# Patient Record
Sex: MALE | Race: WHITE | URBAN - METROPOLITAN AREA
[De-identification: names, ages, dates, MRNs, and addresses within clinical notes are randomized per-mention and may not be internally consistent; named-entity substitution may affect disease eponyms.]

---

## 2017-01-16 ENCOUNTER — HISTORICAL (OUTPATIENT)
Dept: ADMINISTRATIVE | Facility: HOSPITAL | Age: 82
End: 2017-01-16

## 2017-05-01 ENCOUNTER — HISTORICAL (OUTPATIENT)
Dept: ADMINISTRATIVE | Facility: HOSPITAL | Age: 82
End: 2017-05-01

## 2017-05-01 LAB
ABS NEUT (OLG): 6.14 X10(3)/MCL (ref 2.1–9.2)
BASOPHILS # BLD AUTO: 0 X10(3)/MCL (ref 0–0.2)
BASOPHILS NFR BLD AUTO: 0 %
BUN SERPL-MCNC: 38 MG/DL (ref 7–18)
CALCIUM SERPL-MCNC: 9.1 MG/DL (ref 8.5–10.1)
CHLORIDE SERPL-SCNC: 105 MMOL/L (ref 98–107)
CO2 SERPL-SCNC: 32 MMOL/L (ref 21–32)
CREAT SERPL-MCNC: 1.43 MG/DL (ref 0.7–1.3)
EOSINOPHIL # BLD AUTO: 0.1 X10(3)/MCL (ref 0–0.9)
EOSINOPHIL NFR BLD AUTO: 1 %
ERYTHROCYTE [DISTWIDTH] IN BLOOD BY AUTOMATED COUNT: 13.1 % (ref 11.5–17)
GLUCOSE SERPL-MCNC: 89 MG/DL (ref 74–106)
HCT VFR BLD AUTO: 39.2 % (ref 42–52)
HGB BLD-MCNC: 12.9 GM/DL (ref 14–18)
LYMPHOCYTES # BLD AUTO: 0.7 X10(3)/MCL (ref 0.6–4.6)
LYMPHOCYTES NFR BLD AUTO: 9 %
MCH RBC QN AUTO: 33.7 PG (ref 27–31)
MCHC RBC AUTO-ENTMCNC: 32.9 GM/DL (ref 33–36)
MCV RBC AUTO: 102.3 FL (ref 80–94)
MONOCYTES # BLD AUTO: 0.8 X10(3)/MCL (ref 0.1–1.3)
MONOCYTES NFR BLD AUTO: 10 %
NEUTROPHILS # BLD AUTO: 6.14 X10(3)/MCL (ref 2.1–9.2)
NEUTROPHILS NFR BLD AUTO: 79 %
PLATELET # BLD AUTO: 161 X10(3)/MCL (ref 130–400)
PMV BLD AUTO: 11.8 FL (ref 9.4–12.4)
POTASSIUM SERPL-SCNC: 4.8 MMOL/L (ref 3.5–5.1)
PSA SERPL-MCNC: 3.02 NG/ML (ref 0–4)
RBC # BLD AUTO: 3.83 X10(6)/MCL (ref 4.7–6.1)
SODIUM SERPL-SCNC: 144 MMOL/L (ref 136–145)
WBC # SPEC AUTO: 7.8 X10(3)/MCL (ref 4.5–11.5)

## 2017-06-06 ENCOUNTER — HISTORICAL (OUTPATIENT)
Dept: ADMINISTRATIVE | Facility: HOSPITAL | Age: 82
End: 2017-06-06

## 2017-08-09 ENCOUNTER — HISTORICAL (OUTPATIENT)
Dept: ANESTHESIOLOGY | Facility: HOSPITAL | Age: 82
End: 2017-08-09

## 2017-11-08 ENCOUNTER — HISTORICAL (OUTPATIENT)
Dept: ADMINISTRATIVE | Facility: HOSPITAL | Age: 82
End: 2017-11-08

## 2017-11-08 LAB
ABS NEUT (OLG): 6.23 X10(3)/MCL (ref 2.1–9.2)
ALBUMIN SERPL-MCNC: 3.5 GM/DL (ref 3.4–5)
ALBUMIN/GLOB SERPL: 1.3 {RATIO}
ALP SERPL-CCNC: 80 UNIT/L (ref 50–136)
ALT SERPL-CCNC: 12 UNIT/L (ref 12–78)
AST SERPL-CCNC: 12 UNIT/L (ref 15–37)
BASOPHILS # BLD AUTO: 0 X10(3)/MCL (ref 0–0.2)
BASOPHILS NFR BLD AUTO: 0 %
BILIRUB SERPL-MCNC: 1.1 MG/DL (ref 0.2–1)
BILIRUBIN DIRECT+TOT PNL SERPL-MCNC: 0.3 MG/DL (ref 0–0.2)
BILIRUBIN DIRECT+TOT PNL SERPL-MCNC: 0.8 MG/DL (ref 0–0.8)
BUN SERPL-MCNC: 40 MG/DL (ref 7–18)
CALCIUM SERPL-MCNC: 9.4 MG/DL (ref 8.5–10.1)
CHLORIDE SERPL-SCNC: 107 MMOL/L (ref 98–107)
CHOLEST SERPL-MCNC: 142 MG/DL (ref 0–200)
CHOLEST/HDLC SERPL: 2.7 {RATIO} (ref 0–5)
CO2 SERPL-SCNC: 29 MMOL/L (ref 21–32)
CREAT SERPL-MCNC: 1.5 MG/DL (ref 0.7–1.3)
DEPRECATED CALCIDIOL+CALCIFEROL SERPL-MC: 41.93 NG/ML (ref 30–80)
EOSINOPHIL # BLD AUTO: 0 X10(3)/MCL (ref 0–0.9)
EOSINOPHIL NFR BLD AUTO: 0 %
ERYTHROCYTE [DISTWIDTH] IN BLOOD BY AUTOMATED COUNT: 12.9 % (ref 11.5–17)
GLOBULIN SER-MCNC: 2.6 GM/DL (ref 2.4–3.5)
GLUCOSE SERPL-MCNC: 105 MG/DL (ref 74–106)
HCT VFR BLD AUTO: 42 % (ref 42–52)
HDLC SERPL-MCNC: 53 MG/DL (ref 35–60)
HGB BLD-MCNC: 12.9 GM/DL (ref 14–18)
LDLC SERPL CALC-MCNC: 67 MG/DL (ref 0–129)
LYMPHOCYTES # BLD AUTO: 0.5 X10(3)/MCL (ref 0.6–4.6)
LYMPHOCYTES NFR BLD AUTO: 7 %
MCH RBC QN AUTO: 32.8 PG (ref 27–31)
MCHC RBC AUTO-ENTMCNC: 30.7 GM/DL (ref 33–36)
MCV RBC AUTO: 106.9 FL (ref 80–94)
MONOCYTES # BLD AUTO: 0.6 X10(3)/MCL (ref 0.1–1.3)
MONOCYTES NFR BLD AUTO: 8 %
NEUTROPHILS # BLD AUTO: 6.23 X10(3)/MCL (ref 1.4–7.9)
NEUTROPHILS NFR BLD AUTO: 84 %
PLATELET # BLD AUTO: 159 X10(3)/MCL (ref 130–400)
PMV BLD AUTO: 12.4 FL (ref 9.4–12.4)
POTASSIUM SERPL-SCNC: 5.2 MMOL/L (ref 3.5–5.1)
PROT SERPL-MCNC: 6.1 GM/DL (ref 6.4–8.2)
RBC # BLD AUTO: 3.93 X10(6)/MCL (ref 4.7–6.1)
SODIUM SERPL-SCNC: 145 MMOL/L (ref 136–145)
TRIGL SERPL-MCNC: 108 MG/DL (ref 30–150)
VLDLC SERPL CALC-MCNC: 22 MG/DL
WBC # SPEC AUTO: 7.4 X10(3)/MCL (ref 4.5–11.5)

## 2017-12-28 ENCOUNTER — HISTORICAL (OUTPATIENT)
Dept: ANESTHESIOLOGY | Facility: HOSPITAL | Age: 82
End: 2017-12-28

## 2017-12-29 LAB — RAPID GROUP A STREP (OHS): POSITIVE

## 2018-01-16 ENCOUNTER — HISTORICAL (OUTPATIENT)
Dept: RADIOLOGY | Facility: HOSPITAL | Age: 83
End: 2018-01-16

## 2018-02-12 ENCOUNTER — HISTORICAL (OUTPATIENT)
Dept: LAB | Facility: HOSPITAL | Age: 83
End: 2018-02-12

## 2018-02-12 LAB
ABS NEUT (OLG): 3.63 X10(3)/MCL (ref 2.1–9.2)
ALBUMIN SERPL-MCNC: 3.1 GM/DL (ref 3.4–5)
ALBUMIN/GLOB SERPL: 1 {RATIO}
ALP SERPL-CCNC: 65 UNIT/L (ref 50–136)
ALT SERPL-CCNC: 8 UNIT/L (ref 12–78)
APTT PPP: 36.1 SECOND(S) (ref 24.8–36.9)
AST SERPL-CCNC: 12 UNIT/L (ref 15–37)
BASOPHILS # BLD AUTO: 0 X10(3)/MCL (ref 0–0.2)
BASOPHILS NFR BLD AUTO: 1 %
BILIRUB SERPL-MCNC: 0.8 MG/DL (ref 0.2–1)
BILIRUBIN DIRECT+TOT PNL SERPL-MCNC: 0.2 MG/DL (ref 0–0.2)
BILIRUBIN DIRECT+TOT PNL SERPL-MCNC: 0.6 MG/DL (ref 0–0.8)
BUN SERPL-MCNC: 34 MG/DL (ref 7–18)
CALCIUM SERPL-MCNC: 9.1 MG/DL (ref 8.5–10.1)
CHLORIDE SERPL-SCNC: 105 MMOL/L (ref 98–107)
CO2 SERPL-SCNC: 31 MMOL/L (ref 21–32)
CREAT SERPL-MCNC: 1.37 MG/DL (ref 0.7–1.3)
EOSINOPHIL # BLD AUTO: 0.2 X10(3)/MCL (ref 0–0.9)
EOSINOPHIL NFR BLD AUTO: 3 %
ERYTHROCYTE [DISTWIDTH] IN BLOOD BY AUTOMATED COUNT: 12.5 % (ref 11.5–17)
GLOBULIN SER-MCNC: 3 GM/DL (ref 2.4–3.5)
GLUCOSE SERPL-MCNC: 84 MG/DL (ref 74–106)
HCT VFR BLD AUTO: 38 % (ref 42–52)
HGB BLD-MCNC: 12 GM/DL (ref 14–18)
INR PPP: 0.96 (ref 0–1.27)
LYMPHOCYTES # BLD AUTO: 0.7 X10(3)/MCL (ref 0.6–4.6)
LYMPHOCYTES NFR BLD AUTO: 13 %
MCH RBC QN AUTO: 32.3 PG (ref 27–31)
MCHC RBC AUTO-ENTMCNC: 31.6 GM/DL (ref 33–36)
MCV RBC AUTO: 102.2 FL (ref 80–94)
MONOCYTES # BLD AUTO: 0.6 X10(3)/MCL (ref 0.1–1.3)
MONOCYTES NFR BLD AUTO: 12 %
NEUTROPHILS # BLD AUTO: 3.63 X10(3)/MCL (ref 1.4–7.9)
NEUTROPHILS NFR BLD AUTO: 70 %
PLATELET # BLD AUTO: 209 X10(3)/MCL (ref 130–400)
PMV BLD AUTO: 11.6 FL (ref 9.4–12.4)
POTASSIUM SERPL-SCNC: 4.6 MMOL/L (ref 3.5–5.1)
PROT SERPL-MCNC: 6.1 GM/DL (ref 6.4–8.2)
PROTHROMBIN TIME: 13.1 SECOND(S) (ref 12.2–14.7)
RBC # BLD AUTO: 3.72 X10(6)/MCL (ref 4.7–6.1)
SODIUM SERPL-SCNC: 144 MMOL/L (ref 136–145)
WBC # SPEC AUTO: 5.2 X10(3)/MCL (ref 4.5–11.5)

## 2018-02-14 ENCOUNTER — HISTORICAL (OUTPATIENT)
Dept: ADMINISTRATIVE | Facility: HOSPITAL | Age: 83
End: 2018-02-14

## 2018-05-08 ENCOUNTER — HISTORICAL (OUTPATIENT)
Dept: ADMINISTRATIVE | Facility: HOSPITAL | Age: 83
End: 2018-05-08

## 2018-05-08 LAB
ABS NEUT (OLG): 5.65 X10(3)/MCL (ref 2.1–9.2)
BASOPHILS # BLD AUTO: 0 X10(3)/MCL (ref 0–0.2)
BASOPHILS NFR BLD AUTO: 0 %
BUN SERPL-MCNC: 37 MG/DL (ref 7–18)
CALCIUM SERPL-MCNC: 9.3 MG/DL (ref 8.5–10.1)
CHLORIDE SERPL-SCNC: 102 MMOL/L (ref 98–107)
CHOLEST SERPL-MCNC: 191 MG/DL (ref 0–200)
CHOLEST/HDLC SERPL: 3.5 {RATIO} (ref 0–5)
CO2 SERPL-SCNC: 29 MMOL/L (ref 21–32)
CREAT SERPL-MCNC: 1.43 MG/DL (ref 0.7–1.3)
CREAT/UREA NIT SERPL: 25.9
EOSINOPHIL # BLD AUTO: 0 X10(3)/MCL (ref 0–0.9)
EOSINOPHIL NFR BLD AUTO: 1 %
ERYTHROCYTE [DISTWIDTH] IN BLOOD BY AUTOMATED COUNT: 15.5 % (ref 11.5–17)
GLUCOSE SERPL-MCNC: 91 MG/DL (ref 74–106)
HCT VFR BLD AUTO: 40.8 % (ref 42–52)
HDLC SERPL-MCNC: 54 MG/DL (ref 35–60)
HGB BLD-MCNC: 13.2 GM/DL (ref 14–18)
LDLC SERPL CALC-MCNC: 110 MG/DL (ref 0–129)
LYMPHOCYTES # BLD AUTO: 0.3 X10(3)/MCL (ref 0.6–4.6)
LYMPHOCYTES NFR BLD AUTO: 4 %
MCH RBC QN AUTO: 32.4 PG (ref 27–31)
MCHC RBC AUTO-ENTMCNC: 32.4 GM/DL (ref 33–36)
MCV RBC AUTO: 100 FL (ref 80–94)
MONOCYTES # BLD AUTO: 0.4 X10(3)/MCL (ref 0.1–1.3)
MONOCYTES NFR BLD AUTO: 7 %
NEUTROPHILS # BLD AUTO: 5.65 X10(3)/MCL (ref 2.1–9.2)
NEUTROPHILS NFR BLD AUTO: 87 %
PLATELET # BLD AUTO: 167 X10(3)/MCL (ref 130–400)
PMV BLD AUTO: 11.8 FL (ref 9.4–12.4)
POTASSIUM SERPL-SCNC: 4.4 MMOL/L (ref 3.5–5.1)
PSA SERPL-MCNC: 7.55 NG/ML (ref 0–4)
RBC # BLD AUTO: 4.08 X10(6)/MCL (ref 4.7–6.1)
SODIUM SERPL-SCNC: 141 MMOL/L (ref 136–145)
TESTOST SERPL-MCNC: <7 NG/DL (ref 241–827)
TRIGL SERPL-MCNC: 137 MG/DL (ref 30–150)
VLDLC SERPL CALC-MCNC: 27 MG/DL
WBC # SPEC AUTO: 6.5 X10(3)/MCL (ref 4.5–11.5)

## 2018-11-30 ENCOUNTER — HISTORICAL (OUTPATIENT)
Dept: ADMINISTRATIVE | Facility: HOSPITAL | Age: 83
End: 2018-11-30

## 2018-11-30 LAB
ABS NEUT (OLG): 4.05 X10(3)/MCL (ref 2.1–9.2)
ALBUMIN SERPL-MCNC: 3.3 GM/DL (ref 3.4–5)
ALBUMIN/GLOB SERPL: 1.1 {RATIO}
ALP SERPL-CCNC: 74 UNIT/L (ref 50–136)
ALT SERPL-CCNC: 12 UNIT/L (ref 12–78)
AST SERPL-CCNC: 17 UNIT/L (ref 15–37)
BASOPHILS # BLD AUTO: 0 X10(3)/MCL (ref 0–0.2)
BASOPHILS NFR BLD AUTO: 1 %
BILIRUB SERPL-MCNC: 1.2 MG/DL (ref 0.2–1)
BILIRUBIN DIRECT+TOT PNL SERPL-MCNC: 0.2 MG/DL (ref 0–0.2)
BILIRUBIN DIRECT+TOT PNL SERPL-MCNC: 1 MG/DL (ref 0–0.8)
BUN SERPL-MCNC: 37 MG/DL (ref 7–18)
CALCIUM SERPL-MCNC: 9.1 MG/DL (ref 8.5–10.1)
CHLORIDE SERPL-SCNC: 102 MMOL/L (ref 98–107)
CHOLEST SERPL-MCNC: 182 MG/DL (ref 0–200)
CHOLEST/HDLC SERPL: 3.1 {RATIO} (ref 0–5)
CO2 SERPL-SCNC: 29 MMOL/L (ref 21–32)
CREAT SERPL-MCNC: 1.6 MG/DL (ref 0.7–1.3)
DEPRECATED CALCIDIOL+CALCIFEROL SERPL-MC: 39.16 NG/ML (ref 30–80)
EOSINOPHIL # BLD AUTO: 0.1 X10(3)/MCL (ref 0–0.9)
EOSINOPHIL NFR BLD AUTO: 1 %
ERYTHROCYTE [DISTWIDTH] IN BLOOD BY AUTOMATED COUNT: 13.2 % (ref 11.5–17)
GLOBULIN SER-MCNC: 3 GM/DL (ref 2.4–3.5)
GLUCOSE SERPL-MCNC: 93 MG/DL (ref 74–106)
HCT VFR BLD AUTO: 43.1 % (ref 42–52)
HDLC SERPL-MCNC: 59 MG/DL (ref 35–60)
HGB BLD-MCNC: 13.7 GM/DL (ref 14–18)
LDLC SERPL CALC-MCNC: 95 MG/DL (ref 0–129)
LYMPHOCYTES # BLD AUTO: 0.5 X10(3)/MCL (ref 0.6–4.6)
LYMPHOCYTES NFR BLD AUTO: 10 %
MCH RBC QN AUTO: 32.7 PG (ref 27–31)
MCHC RBC AUTO-ENTMCNC: 31.8 GM/DL (ref 33–36)
MCV RBC AUTO: 102.9 FL (ref 80–94)
MONOCYTES # BLD AUTO: 0.7 X10(3)/MCL (ref 0.1–1.3)
MONOCYTES NFR BLD AUTO: 13 %
NEUTROPHILS # BLD AUTO: 4.05 X10(3)/MCL (ref 2.1–9.2)
NEUTROPHILS NFR BLD AUTO: 75 %
NRBC BLD AUTO-RTO: 0.6 % (ref 0–0.2)
PLATELET # BLD AUTO: 166 X10(3)/MCL (ref 130–400)
PMV BLD AUTO: 11.6 FL (ref 9.4–12.4)
POTASSIUM SERPL-SCNC: 5 MMOL/L (ref 3.5–5.1)
PROT SERPL-MCNC: 6.3 GM/DL (ref 6.4–8.2)
PSA SERPL-MCNC: 12.5 NG/ML (ref 0–4)
RBC # BLD AUTO: 4.19 X10(6)/MCL (ref 4.7–6.1)
SODIUM SERPL-SCNC: 139 MMOL/L (ref 136–145)
TESTOST SERPL-MCNC: <7 NG/DL (ref 241–827)
TRIGL SERPL-MCNC: 142 MG/DL (ref 30–150)
VLDLC SERPL CALC-MCNC: 28 MG/DL
WBC # SPEC AUTO: 5.4 X10(3)/MCL (ref 4.5–11.5)

## 2019-02-06 ENCOUNTER — HISTORICAL (OUTPATIENT)
Dept: ANESTHESIOLOGY | Facility: HOSPITAL | Age: 84
End: 2019-02-06

## 2019-05-21 ENCOUNTER — HISTORICAL (OUTPATIENT)
Dept: ANESTHESIOLOGY | Facility: HOSPITAL | Age: 84
End: 2019-05-21

## 2019-08-30 ENCOUNTER — HISTORICAL (OUTPATIENT)
Dept: ADMINISTRATIVE | Facility: HOSPITAL | Age: 84
End: 2019-08-30

## 2019-08-30 LAB
BUN SERPL-MCNC: 28 MG/DL (ref 7–18)
CALCIUM SERPL-MCNC: 9.3 MG/DL (ref 8.5–10.1)
CHLORIDE SERPL-SCNC: 106 MMOL/L (ref 98–107)
CHOLEST SERPL-MCNC: 155 MG/DL (ref 0–200)
CHOLEST/HDLC SERPL: 2.5 {RATIO} (ref 0–5)
CO2 SERPL-SCNC: 30 MMOL/L (ref 21–32)
CREAT SERPL-MCNC: 1.53 MG/DL (ref 0.7–1.3)
CREAT/UREA NIT SERPL: 18.3
GLUCOSE SERPL-MCNC: 100 MG/DL (ref 74–106)
HDLC SERPL-MCNC: 62 MG/DL (ref 35–60)
LDLC SERPL CALC-MCNC: 66 MG/DL (ref 0–129)
POTASSIUM SERPL-SCNC: 4.4 MMOL/L (ref 3.5–5.1)
SODIUM SERPL-SCNC: 141 MMOL/L (ref 136–145)
TRIGL SERPL-MCNC: 137 MG/DL (ref 30–150)
VLDLC SERPL CALC-MCNC: 27 MG/DL

## 2019-09-11 ENCOUNTER — HISTORICAL (OUTPATIENT)
Dept: ADMINISTRATIVE | Facility: HOSPITAL | Age: 84
End: 2019-09-11

## 2019-09-11 LAB
ABS NEUT (OLG): 4.58 X10(3)/MCL (ref 2.1–9.2)
BASOPHILS # BLD AUTO: 0 X10(3)/MCL (ref 0–0.2)
BASOPHILS NFR BLD AUTO: 0 %
BNP BLD-MCNC: 133 PG/ML (ref 0–125)
EOSINOPHIL # BLD AUTO: 0.1 X10(3)/MCL (ref 0–0.9)
EOSINOPHIL NFR BLD AUTO: 2 %
ERYTHROCYTE [DISTWIDTH] IN BLOOD BY AUTOMATED COUNT: 13.2 % (ref 11.5–17)
HCT VFR BLD AUTO: 45.1 % (ref 42–52)
HGB BLD-MCNC: 14.3 GM/DL (ref 14–18)
LYMPHOCYTES # BLD AUTO: 0.3 X10(3)/MCL (ref 0.6–4.6)
LYMPHOCYTES NFR BLD AUTO: 5 %
MCH RBC QN AUTO: 33.1 PG (ref 27–31)
MCHC RBC AUTO-ENTMCNC: 31.7 GM/DL (ref 33–36)
MCV RBC AUTO: 104.4 FL (ref 80–94)
MONOCYTES # BLD AUTO: 0.5 X10(3)/MCL (ref 0.1–1.3)
MONOCYTES NFR BLD AUTO: 9 %
NEUTROPHILS # BLD AUTO: 4.58 X10(3)/MCL (ref 2.1–9.2)
NEUTROPHILS NFR BLD AUTO: 83 %
PLATELET # BLD AUTO: 164 X10(3)/MCL (ref 130–400)
PMV BLD AUTO: 11.2 FL (ref 9.4–12.4)
RBC # BLD AUTO: 4.32 X10(6)/MCL (ref 4.7–6.1)
WBC # SPEC AUTO: 5.5 X10(3)/MCL (ref 4.5–11.5)

## 2019-09-12 ENCOUNTER — HISTORICAL (OUTPATIENT)
Dept: CARDIOLOGY | Facility: HOSPITAL | Age: 84
End: 2019-09-12

## 2020-01-09 ENCOUNTER — HISTORICAL (OUTPATIENT)
Dept: ANESTHESIOLOGY | Facility: HOSPITAL | Age: 85
End: 2020-01-09

## 2020-05-21 ENCOUNTER — HISTORICAL (OUTPATIENT)
Dept: ADMINISTRATIVE | Facility: HOSPITAL | Age: 85
End: 2020-05-21

## 2020-05-21 LAB — POC CREATININE: 1.5 MG/DL (ref 0.6–1.3)

## 2020-05-26 ENCOUNTER — HISTORICAL (OUTPATIENT)
Dept: ANESTHESIOLOGY | Facility: HOSPITAL | Age: 85
End: 2020-05-26

## 2020-06-04 ENCOUNTER — HISTORICAL (OUTPATIENT)
Dept: HEMATOLOGY/ONCOLOGY | Facility: CLINIC | Age: 85
End: 2020-06-04

## 2020-06-04 LAB
ABS NEUT (OLG): 3.53 X10(3)/MCL (ref 2.1–9.2)
ALBUMIN SERPL-MCNC: 3.5 GM/DL (ref 3.4–5)
ALBUMIN/GLOB SERPL: 1.1 RATIO (ref 1.1–2)
ALP SERPL-CCNC: 210 UNIT/L (ref 40–150)
ALT SERPL-CCNC: 6 UNIT/L (ref 0–55)
AST SERPL-CCNC: 22 UNIT/L (ref 5–34)
BASOPHILS # BLD AUTO: 0 X10(3)/MCL (ref 0–0.2)
BASOPHILS NFR BLD AUTO: 0.6 %
BILIRUB SERPL-MCNC: 0.5 MG/DL
BILIRUBIN DIRECT+TOT PNL SERPL-MCNC: 0.2 MG/DL (ref 0–0.5)
BILIRUBIN DIRECT+TOT PNL SERPL-MCNC: 0.3 MG/DL (ref 0–0.8)
BUN SERPL-MCNC: 27.5 MG/DL (ref 8.4–25.7)
CALCIUM SERPL-MCNC: 9 MG/DL (ref 8.8–10)
CHLORIDE SERPL-SCNC: 105 MMOL/L (ref 98–107)
CO2 SERPL-SCNC: 29 MMOL/L (ref 23–31)
CREAT SERPL-MCNC: 1.49 MG/DL (ref 0.73–1.18)
EOSINOPHIL # BLD AUTO: 0.1 X10(3)/MCL (ref 0–0.9)
EOSINOPHIL NFR BLD AUTO: 2.1 %
ERYTHROCYTE [DISTWIDTH] IN BLOOD BY AUTOMATED COUNT: 12.9 % (ref 11.5–17)
GLOBULIN SER-MCNC: 3.2 GM/DL (ref 2.4–3.5)
GLUCOSE SERPL-MCNC: 101 MG/DL (ref 82–115)
HCT VFR BLD AUTO: 43.4 % (ref 42–52)
HGB BLD-MCNC: 13.6 GM/DL (ref 14–18)
LYMPHOCYTES # BLD AUTO: 0.6 X10(3)/MCL (ref 0.6–4.6)
LYMPHOCYTES NFR BLD AUTO: 11.8 %
MCH RBC QN AUTO: 32.2 PG (ref 27–31)
MCHC RBC AUTO-ENTMCNC: 31.3 GM/DL (ref 33–36)
MCV RBC AUTO: 102.6 FL (ref 80–94)
MONOCYTES # BLD AUTO: 0.6 X10(3)/MCL (ref 0.1–1.3)
MONOCYTES NFR BLD AUTO: 11.8 %
NEUTROPHILS # BLD AUTO: 3.5 X10(3)/MCL (ref 2.1–9.2)
NEUTROPHILS NFR BLD AUTO: 73.3 %
PLATELET # BLD AUTO: 257 X10(3)/MCL (ref 130–400)
PMV BLD AUTO: 9.5 FL (ref 9.4–12.4)
POTASSIUM SERPL-SCNC: 5.6 MMOL/L (ref 3.5–5.1)
PROT SERPL-MCNC: 6.7 GM/DL (ref 5.8–7.6)
PSA SERPL-MCNC: 332.86 NG/ML
RBC # BLD AUTO: 4.23 X10(6)/MCL (ref 4.7–6.1)
SODIUM SERPL-SCNC: 141 MMOL/L (ref 136–145)
WBC # SPEC AUTO: 4.8 X10(3)/MCL (ref 4.5–11.5)

## 2020-07-07 ENCOUNTER — HISTORICAL (OUTPATIENT)
Dept: INFUSION THERAPY | Facility: HOSPITAL | Age: 85
End: 2020-07-07

## 2020-07-27 ENCOUNTER — HISTORICAL (OUTPATIENT)
Dept: HEMATOLOGY/ONCOLOGY | Facility: CLINIC | Age: 85
End: 2020-07-27

## 2020-07-27 LAB
ABS NEUT (OLG): 6.42 X10(3)/MCL (ref 2.1–9.2)
ALBUMIN SERPL-MCNC: 3.5 GM/DL (ref 3.4–4.8)
ALBUMIN/GLOB SERPL: 1.2 RATIO (ref 1.1–2)
ALP SERPL-CCNC: 191 UNIT/L (ref 40–150)
ALT SERPL-CCNC: 6 UNIT/L (ref 0–55)
AST SERPL-CCNC: 19 UNIT/L (ref 5–34)
BASOPHILS # BLD AUTO: 0 X10(3)/MCL (ref 0–0.2)
BASOPHILS NFR BLD AUTO: 0.3 %
BILIRUB SERPL-MCNC: 1 MG/DL
BILIRUBIN DIRECT+TOT PNL SERPL-MCNC: 0.4 MG/DL (ref 0–0.5)
BILIRUBIN DIRECT+TOT PNL SERPL-MCNC: 0.6 MG/DL (ref 0–0.8)
BUN SERPL-MCNC: 31.4 MG/DL (ref 8.4–25.7)
CALCIUM SERPL-MCNC: 8.9 MG/DL (ref 8.8–10)
CHLORIDE SERPL-SCNC: 106 MMOL/L (ref 98–107)
CO2 SERPL-SCNC: 26 MMOL/L (ref 23–31)
CREAT SERPL-MCNC: 1.45 MG/DL (ref 0.73–1.18)
EOSINOPHIL # BLD AUTO: 0.1 X10(3)/MCL (ref 0–0.9)
EOSINOPHIL NFR BLD AUTO: 1 %
ERYTHROCYTE [DISTWIDTH] IN BLOOD BY AUTOMATED COUNT: 15.7 % (ref 11.5–17)
GLOBULIN SER-MCNC: 3 GM/DL (ref 2.4–3.5)
GLUCOSE SERPL-MCNC: 104 MG/DL (ref 82–115)
HCT VFR BLD AUTO: 44.8 % (ref 42–52)
HGB BLD-MCNC: 13.6 GM/DL (ref 14–18)
LYMPHOCYTES # BLD AUTO: 0.5 X10(3)/MCL (ref 0.6–4.6)
LYMPHOCYTES NFR BLD AUTO: 5.9 %
MCH RBC QN AUTO: 32.3 PG (ref 27–31)
MCHC RBC AUTO-ENTMCNC: 30.4 GM/DL (ref 33–36)
MCV RBC AUTO: 106.4 FL (ref 80–94)
MONOCYTES # BLD AUTO: 0.7 X10(3)/MCL (ref 0.1–1.3)
MONOCYTES NFR BLD AUTO: 9.1 %
NEUTROPHILS # BLD AUTO: 6.4 X10(3)/MCL (ref 2.1–9.2)
NEUTROPHILS NFR BLD AUTO: 82.5 %
PLATELET # BLD AUTO: 184 X10(3)/MCL (ref 130–400)
PMV BLD AUTO: 10.3 FL (ref 9.4–12.4)
POTASSIUM SERPL-SCNC: 5.1 MMOL/L (ref 3.5–5.1)
PROT SERPL-MCNC: 6.5 GM/DL (ref 5.8–7.6)
PSA SERPL-MCNC: 423.18 NG/ML
RBC # BLD AUTO: 4.21 X10(6)/MCL (ref 4.7–6.1)
SODIUM SERPL-SCNC: 142 MMOL/L (ref 136–145)
WBC # SPEC AUTO: 7.8 X10(3)/MCL (ref 4.5–11.5)

## 2020-08-04 ENCOUNTER — HISTORICAL (OUTPATIENT)
Dept: INFUSION THERAPY | Facility: HOSPITAL | Age: 85
End: 2020-08-04

## 2020-08-28 ENCOUNTER — HISTORICAL (OUTPATIENT)
Dept: HEMATOLOGY/ONCOLOGY | Facility: CLINIC | Age: 85
End: 2020-08-28

## 2020-08-28 LAB
ABS NEUT (OLG): 7.37 X10(3)/MCL (ref 2.1–9.2)
ALBUMIN SERPL-MCNC: 3.5 GM/DL (ref 3.4–5)
ALBUMIN/GLOB SERPL: 1.3 RATIO (ref 1.1–2)
ALP SERPL-CCNC: 192 UNIT/L (ref 40–150)
ALT SERPL-CCNC: 5 UNIT/L (ref 0–55)
AST SERPL-CCNC: 19 UNIT/L (ref 5–34)
BASOPHILS # BLD AUTO: 0 X10(3)/MCL (ref 0–0.2)
BASOPHILS NFR BLD AUTO: 0.6 %
BILIRUB SERPL-MCNC: 1.4 MG/DL
BILIRUBIN DIRECT+TOT PNL SERPL-MCNC: 0.5 MG/DL (ref 0–0.5)
BILIRUBIN DIRECT+TOT PNL SERPL-MCNC: 0.9 MG/DL (ref 0–0.8)
BUN SERPL-MCNC: 22.5 MG/DL (ref 8.4–25.7)
CALCIUM SERPL-MCNC: 7.9 MG/DL (ref 8.8–10)
CHLORIDE SERPL-SCNC: 104 MMOL/L (ref 98–107)
CO2 SERPL-SCNC: 28 MMOL/L (ref 23–31)
CREAT SERPL-MCNC: 1.38 MG/DL (ref 0.73–1.18)
EOSINOPHIL # BLD AUTO: 0.1 X10(3)/MCL (ref 0–0.9)
EOSINOPHIL NFR BLD AUTO: 0.9 %
ERYTHROCYTE [DISTWIDTH] IN BLOOD BY AUTOMATED COUNT: 14.5 % (ref 11.5–17)
GLOBULIN SER-MCNC: 2.7 GM/DL (ref 2.4–3.5)
GLUCOSE SERPL-MCNC: 79 MG/DL (ref 82–115)
HCT VFR BLD AUTO: 46.8 % (ref 42–52)
HGB BLD-MCNC: 14.7 GM/DL (ref 14–18)
LYMPHOCYTES # BLD AUTO: 0.5 X10(3)/MCL (ref 0.6–4.6)
LYMPHOCYTES NFR BLD AUTO: 5.4 %
MCH RBC QN AUTO: 32.8 PG (ref 27–31)
MCHC RBC AUTO-ENTMCNC: 31.4 GM/DL (ref 33–36)
MCV RBC AUTO: 104.5 FL (ref 80–94)
MONOCYTES # BLD AUTO: 0.5 X10(3)/MCL (ref 0.1–1.3)
MONOCYTES NFR BLD AUTO: 5.9 %
NEUTROPHILS # BLD AUTO: 7.4 X10(3)/MCL (ref 2.1–9.2)
NEUTROPHILS NFR BLD AUTO: 86.3 %
PLATELET # BLD AUTO: 198 X10(3)/MCL (ref 130–400)
PMV BLD AUTO: 10.5 FL (ref 9.4–12.4)
POTASSIUM SERPL-SCNC: 5.3 MMOL/L (ref 3.5–5.1)
PROT SERPL-MCNC: 6.2 GM/DL (ref 5.8–7.6)
PSA SERPL-MCNC: 385.37 NG/ML
RBC # BLD AUTO: 4.48 X10(6)/MCL (ref 4.7–6.1)
SODIUM SERPL-SCNC: 139 MMOL/L (ref 136–145)
WBC # SPEC AUTO: 8.5 X10(3)/MCL (ref 4.5–11.5)

## 2020-08-31 ENCOUNTER — HISTORICAL (OUTPATIENT)
Dept: INFUSION THERAPY | Facility: HOSPITAL | Age: 85
End: 2020-08-31

## 2020-09-25 ENCOUNTER — HISTORICAL (OUTPATIENT)
Dept: HEMATOLOGY/ONCOLOGY | Facility: CLINIC | Age: 85
End: 2020-09-25

## 2020-09-25 LAB
ABS NEUT (OLG): 8.65 X10(3)/MCL (ref 2.1–9.2)
ALBUMIN SERPL-MCNC: 3.7 GM/DL (ref 3.4–5)
ALBUMIN/GLOB SERPL: 1.4 RATIO (ref 1.1–2)
ALP SERPL-CCNC: 161 UNIT/L (ref 40–150)
ALT SERPL-CCNC: 8 UNIT/L (ref 0–55)
AST SERPL-CCNC: 22 UNIT/L (ref 5–34)
BASOPHILS # BLD AUTO: 0 X10(3)/MCL (ref 0–0.2)
BASOPHILS NFR BLD AUTO: 0.2 %
BILIRUB SERPL-MCNC: 1.2 MG/DL
BILIRUBIN DIRECT+TOT PNL SERPL-MCNC: 0.4 MG/DL (ref 0–0.5)
BILIRUBIN DIRECT+TOT PNL SERPL-MCNC: 0.8 MG/DL (ref 0–0.8)
BUN SERPL-MCNC: 31.3 MG/DL (ref 8.4–25.7)
CALCIUM SERPL-MCNC: 8.7 MG/DL (ref 8.8–10)
CHLORIDE SERPL-SCNC: 102 MMOL/L (ref 98–107)
CO2 SERPL-SCNC: 32 MMOL/L (ref 23–31)
CREAT SERPL-MCNC: 1.4 MG/DL (ref 0.73–1.18)
EOSINOPHIL # BLD AUTO: 0 X10(3)/MCL (ref 0–0.9)
EOSINOPHIL NFR BLD AUTO: 0.1 %
ERYTHROCYTE [DISTWIDTH] IN BLOOD BY AUTOMATED COUNT: 13.6 % (ref 11.5–17)
GLOBULIN SER-MCNC: 2.7 GM/DL (ref 2.4–3.5)
GLUCOSE SERPL-MCNC: 105 MG/DL (ref 82–115)
HCT VFR BLD AUTO: 50.3 % (ref 42–52)
HGB BLD-MCNC: 15.9 GM/DL (ref 14–18)
LYMPHOCYTES # BLD AUTO: 0.5 X10(3)/MCL (ref 0.6–4.6)
LYMPHOCYTES NFR BLD AUTO: 5.1 %
MCH RBC QN AUTO: 32.3 PG (ref 27–31)
MCHC RBC AUTO-ENTMCNC: 31.6 GM/DL (ref 33–36)
MCV RBC AUTO: 102.2 FL (ref 80–94)
MONOCYTES # BLD AUTO: 0.4 X10(3)/MCL (ref 0.1–1.3)
MONOCYTES NFR BLD AUTO: 4.4 %
NEUTROPHILS # BLD AUTO: 8.6 X10(3)/MCL (ref 2.1–9.2)
NEUTROPHILS NFR BLD AUTO: 89.1 %
PLATELET # BLD AUTO: 198 X10(3)/MCL (ref 130–400)
PMV BLD AUTO: 10.5 FL (ref 9.4–12.4)
POTASSIUM SERPL-SCNC: 5.2 MMOL/L (ref 3.5–5.1)
PROT SERPL-MCNC: 6.4 GM/DL (ref 5.8–7.6)
PSA SERPL-MCNC: 395.66 NG/ML
RBC # BLD AUTO: 4.92 X10(6)/MCL (ref 4.7–6.1)
SODIUM SERPL-SCNC: 141 MMOL/L (ref 136–145)
WBC # SPEC AUTO: 9.7 X10(3)/MCL (ref 4.5–11.5)

## 2020-09-28 ENCOUNTER — HISTORICAL (OUTPATIENT)
Dept: INFUSION THERAPY | Facility: HOSPITAL | Age: 85
End: 2020-09-28

## 2020-10-23 ENCOUNTER — HISTORICAL (OUTPATIENT)
Dept: HEMATOLOGY/ONCOLOGY | Facility: CLINIC | Age: 85
End: 2020-10-23

## 2020-10-23 LAB
ABS NEUT (OLG): 8.75 X10(3)/MCL (ref 2.1–9.2)
ALBUMIN SERPL-MCNC: 3.5 GM/DL (ref 3.4–4.8)
ALBUMIN/GLOB SERPL: 1.1 RATIO (ref 1.1–2)
ALP SERPL-CCNC: 160 UNIT/L (ref 40–150)
ALT SERPL-CCNC: 8 UNIT/L (ref 0–55)
AST SERPL-CCNC: 22 UNIT/L (ref 5–34)
BASOPHILS # BLD AUTO: 0 X10(3)/MCL (ref 0–0.2)
BASOPHILS NFR BLD AUTO: 0.2 %
BILIRUB SERPL-MCNC: 1.1 MG/DL
BILIRUBIN DIRECT+TOT PNL SERPL-MCNC: 0.5 MG/DL (ref 0–0.5)
BILIRUBIN DIRECT+TOT PNL SERPL-MCNC: 0.6 MG/DL (ref 0–0.8)
BUN SERPL-MCNC: 35 MG/DL (ref 8.4–25.7)
CALCIUM SERPL-MCNC: 8.9 MG/DL (ref 8.8–10)
CHLORIDE SERPL-SCNC: 105 MMOL/L (ref 98–107)
CO2 SERPL-SCNC: 28 MMOL/L (ref 23–31)
CREAT SERPL-MCNC: 1.58 MG/DL (ref 0.73–1.18)
EOSINOPHIL # BLD AUTO: 0.1 X10(3)/MCL (ref 0–0.9)
EOSINOPHIL NFR BLD AUTO: 0.8 %
ERYTHROCYTE [DISTWIDTH] IN BLOOD BY AUTOMATED COUNT: 13.2 % (ref 11.5–17)
GLOBULIN SER-MCNC: 3.1 GM/DL (ref 2.4–3.5)
GLUCOSE SERPL-MCNC: 87 MG/DL (ref 82–115)
HCT VFR BLD AUTO: 48.5 % (ref 42–52)
HGB BLD-MCNC: 15.6 GM/DL (ref 14–18)
LYMPHOCYTES # BLD AUTO: 0.3 X10(3)/MCL (ref 0.6–4.6)
LYMPHOCYTES NFR BLD AUTO: 3.4 %
MCH RBC QN AUTO: 31.9 PG (ref 27–31)
MCHC RBC AUTO-ENTMCNC: 32.2 GM/DL (ref 33–36)
MCV RBC AUTO: 99.2 FL (ref 80–94)
MONOCYTES # BLD AUTO: 0.6 X10(3)/MCL (ref 0.1–1.3)
MONOCYTES NFR BLD AUTO: 5.9 %
NEUTROPHILS # BLD AUTO: 8.8 X10(3)/MCL (ref 2.1–9.2)
NEUTROPHILS NFR BLD AUTO: 88.1 %
PLATELET # BLD AUTO: 193 X10(3)/MCL (ref 130–400)
PMV BLD AUTO: 10.8 FL (ref 9.4–12.4)
POTASSIUM SERPL-SCNC: 4.7 MMOL/L (ref 3.5–5.1)
PROT SERPL-MCNC: 6.6 GM/DL (ref 5.8–7.6)
PSA SERPL-MCNC: 324.24 NG/ML
RBC # BLD AUTO: 4.89 X10(6)/MCL (ref 4.7–6.1)
SODIUM SERPL-SCNC: 146 MMOL/L (ref 136–145)
WBC # SPEC AUTO: 9.9 X10(3)/MCL (ref 4.5–11.5)

## 2020-10-26 ENCOUNTER — HISTORICAL (OUTPATIENT)
Dept: INFUSION THERAPY | Facility: HOSPITAL | Age: 85
End: 2020-10-26

## 2020-11-20 ENCOUNTER — HISTORICAL (OUTPATIENT)
Dept: HEMATOLOGY/ONCOLOGY | Facility: CLINIC | Age: 85
End: 2020-11-20

## 2020-11-20 LAB
ABS NEUT (OLG): 7.96 X10(3)/MCL (ref 2.1–9.2)
ALBUMIN SERPL-MCNC: 3.5 GM/DL (ref 3.4–4.8)
ALBUMIN/GLOB SERPL: 1.2 RATIO (ref 1.1–2)
ALP SERPL-CCNC: 153 UNIT/L (ref 40–150)
ALT SERPL-CCNC: 7 UNIT/L (ref 0–55)
AST SERPL-CCNC: 22 UNIT/L (ref 5–34)
BASOPHILS # BLD AUTO: 0 X10(3)/MCL (ref 0–0.2)
BASOPHILS NFR BLD AUTO: 0.3 %
BILIRUB SERPL-MCNC: 1.2 MG/DL
BILIRUBIN DIRECT+TOT PNL SERPL-MCNC: 0.4 MG/DL (ref 0–0.5)
BILIRUBIN DIRECT+TOT PNL SERPL-MCNC: 0.8 MG/DL (ref 0–0.8)
BUN SERPL-MCNC: 28.2 MG/DL (ref 8.4–25.7)
CALCIUM SERPL-MCNC: 8.4 MG/DL (ref 8.8–10)
CHLORIDE SERPL-SCNC: 104 MMOL/L (ref 98–107)
CO2 SERPL-SCNC: 29 MMOL/L (ref 23–31)
CREAT SERPL-MCNC: 1.52 MG/DL (ref 0.73–1.18)
EOSINOPHIL # BLD AUTO: 0.2 X10(3)/MCL (ref 0–0.9)
EOSINOPHIL NFR BLD AUTO: 1.6 %
ERYTHROCYTE [DISTWIDTH] IN BLOOD BY AUTOMATED COUNT: 13.5 % (ref 11.5–17)
GLOBULIN SER-MCNC: 3 GM/DL (ref 2.4–3.5)
GLUCOSE SERPL-MCNC: 87 MG/DL (ref 82–115)
HCT VFR BLD AUTO: 48.6 % (ref 42–52)
HGB BLD-MCNC: 15.3 GM/DL (ref 14–18)
LYMPHOCYTES # BLD AUTO: 0.4 X10(3)/MCL (ref 0.6–4.6)
LYMPHOCYTES NFR BLD AUTO: 4.8 %
MCH RBC QN AUTO: 31.5 PG (ref 27–31)
MCHC RBC AUTO-ENTMCNC: 31.5 GM/DL (ref 33–36)
MCV RBC AUTO: 100 FL (ref 80–94)
MONOCYTES # BLD AUTO: 0.5 X10(3)/MCL (ref 0.1–1.3)
MONOCYTES NFR BLD AUTO: 5.6 %
NEUTROPHILS # BLD AUTO: 8 X10(3)/MCL (ref 2.1–9.2)
NEUTROPHILS NFR BLD AUTO: 86.2 %
PLATELET # BLD AUTO: 176 X10(3)/MCL (ref 130–400)
PMV BLD AUTO: 10.7 FL (ref 9.4–12.4)
POTASSIUM SERPL-SCNC: 5.4 MMOL/L (ref 3.5–5.1)
PROT SERPL-MCNC: 6.5 GM/DL (ref 5.8–7.6)
PSA SERPL-MCNC: 306.51 NG/ML
RBC # BLD AUTO: 4.86 X10(6)/MCL (ref 4.7–6.1)
SODIUM SERPL-SCNC: 143 MMOL/L (ref 136–145)
WBC # SPEC AUTO: 9.2 X10(3)/MCL (ref 4.5–11.5)

## 2020-11-24 ENCOUNTER — HISTORICAL (OUTPATIENT)
Dept: INFUSION THERAPY | Facility: HOSPITAL | Age: 85
End: 2020-11-24

## 2020-12-10 ENCOUNTER — HISTORICAL (OUTPATIENT)
Dept: ADMINISTRATIVE | Facility: HOSPITAL | Age: 85
End: 2020-12-10

## 2020-12-10 LAB
ABS NEUT (OLG): 9.18 X10(3)/MCL (ref 2.1–9.2)
ALBUMIN SERPL-MCNC: 3.7 GM/DL (ref 3.4–4.8)
ALBUMIN/GLOB SERPL: 1.2 RATIO (ref 1.1–2)
ALP SERPL-CCNC: 151 UNIT/L (ref 40–150)
ALT SERPL-CCNC: 9 UNIT/L (ref 0–55)
AST SERPL-CCNC: 28 UNIT/L (ref 5–34)
BASOPHILS # BLD AUTO: 0 X10(3)/MCL (ref 0–0.2)
BASOPHILS NFR BLD AUTO: 0 %
BILIRUB SERPL-MCNC: 1.3 MG/DL
BILIRUBIN DIRECT+TOT PNL SERPL-MCNC: 0.5 MG/DL (ref 0–0.5)
BILIRUBIN DIRECT+TOT PNL SERPL-MCNC: 0.8 MG/DL (ref 0–0.8)
BUN SERPL-MCNC: 38.3 MG/DL (ref 8.4–25.7)
CALCIUM SERPL-MCNC: 8.6 MG/DL (ref 8.8–10)
CHLORIDE SERPL-SCNC: 101 MMOL/L (ref 98–107)
CHOLEST SERPL-MCNC: 164 MG/DL
CHOLEST/HDLC SERPL: 3 {RATIO} (ref 0–5)
CO2 SERPL-SCNC: 29 MMOL/L (ref 23–31)
CREAT SERPL-MCNC: 1.6 MG/DL (ref 0.73–1.18)
DEPRECATED CALCIDIOL+CALCIFEROL SERPL-MC: 55.3 NG/ML (ref 30–80)
EOSINOPHIL # BLD AUTO: 0.1 X10(3)/MCL (ref 0–0.9)
EOSINOPHIL NFR BLD AUTO: 1 %
ERYTHROCYTE [DISTWIDTH] IN BLOOD BY AUTOMATED COUNT: 14.3 % (ref 11.5–17)
GLOBULIN SER-MCNC: 3 GM/DL (ref 2.4–3.5)
GLUCOSE SERPL-MCNC: 89 MG/DL (ref 82–115)
HCT VFR BLD AUTO: 50.5 % (ref 42–52)
HDLC SERPL-MCNC: 59 MG/DL (ref 35–60)
HGB BLD-MCNC: 15.7 GM/DL (ref 14–18)
LDLC SERPL CALC-MCNC: 84 MG/DL (ref 50–140)
LYMPHOCYTES # BLD AUTO: 0.6 X10(3)/MCL (ref 0.6–4.6)
LYMPHOCYTES NFR BLD AUTO: 5 %
MCH RBC QN AUTO: 30.8 PG (ref 27–31)
MCHC RBC AUTO-ENTMCNC: 31.1 GM/DL (ref 33–36)
MCV RBC AUTO: 99 FL (ref 80–94)
MONOCYTES # BLD AUTO: 0.6 X10(3)/MCL (ref 0.1–1.3)
MONOCYTES NFR BLD AUTO: 5 %
NEUTROPHILS # BLD AUTO: 9.18 X10(3)/MCL (ref 2.1–9.2)
NEUTROPHILS NFR BLD AUTO: 86 %
PLATELET # BLD AUTO: 181 X10(3)/MCL (ref 130–400)
PMV BLD AUTO: 11.4 FL (ref 9.4–12.4)
POTASSIUM SERPL-SCNC: 4.9 MMOL/L (ref 3.5–5.1)
PROT SERPL-MCNC: 6.7 GM/DL (ref 5.8–7.6)
RBC # BLD AUTO: 5.1 X10(6)/MCL (ref 4.7–6.1)
SODIUM SERPL-SCNC: 141 MMOL/L (ref 136–145)
TRIGL SERPL-MCNC: 107 MG/DL (ref 34–140)
TSH SERPL-ACNC: 1.29 UIU/ML (ref 0.35–4.94)
VLDLC SERPL CALC-MCNC: 21 MG/DL
WBC # SPEC AUTO: 10.6 X10(3)/MCL (ref 4.5–11.5)

## 2020-12-11 ENCOUNTER — HISTORICAL (OUTPATIENT)
Dept: ADMINISTRATIVE | Facility: HOSPITAL | Age: 85
End: 2020-12-11

## 2020-12-11 LAB
APPEARANCE, UA: CLEAR
BACTERIA SPEC CULT: NORMAL /HPF
BILIRUB UR QL STRIP: NEGATIVE
COLOR UR: YELLOW
GLUCOSE (UA): NEGATIVE
HGB UR QL STRIP: NEGATIVE
KETONES UR QL STRIP: NEGATIVE
LEUKOCYTE ESTERASE UR QL STRIP: NEGATIVE
NITRITE UR QL STRIP: NEGATIVE
PH UR STRIP: 5.5 [PH] (ref 5–9)
PROT UR QL STRIP: NEGATIVE
RBC #/AREA URNS HPF: NORMAL /[HPF]
SP GR UR STRIP: 1.02 (ref 1–1.03)
SQUAMOUS EPITHELIAL, UA: NORMAL
UROBILINOGEN UR STRIP-ACNC: 1
WBC #/AREA URNS HPF: NORMAL /HPF

## 2020-12-22 ENCOUNTER — HISTORICAL (OUTPATIENT)
Dept: INFUSION THERAPY | Facility: HOSPITAL | Age: 85
End: 2020-12-22

## 2020-12-22 LAB
ABS NEUT (OLG): 9.29 X10(3)/MCL (ref 2.1–9.2)
ALBUMIN SERPL-MCNC: 3.6 GM/DL (ref 3.4–4.8)
ALBUMIN/GLOB SERPL: 1.1 RATIO (ref 1.1–2)
ALP SERPL-CCNC: 206 UNIT/L (ref 40–150)
ALT SERPL-CCNC: 8 UNIT/L (ref 0–55)
AST SERPL-CCNC: 36 UNIT/L (ref 5–34)
BASOPHILS # BLD AUTO: 0 X10(3)/MCL (ref 0–0.2)
BASOPHILS NFR BLD AUTO: 0.3 %
BILIRUB SERPL-MCNC: 2 MG/DL
BILIRUBIN DIRECT+TOT PNL SERPL-MCNC: 0.8 MG/DL (ref 0–0.5)
BILIRUBIN DIRECT+TOT PNL SERPL-MCNC: 1.2 MG/DL (ref 0–0.8)
BUN SERPL-MCNC: 41 MG/DL (ref 8.4–25.7)
CALCIUM SERPL-MCNC: 8.6 MG/DL (ref 8.8–10)
CHLORIDE SERPL-SCNC: 99 MMOL/L (ref 98–107)
CO2 SERPL-SCNC: 32 MMOL/L (ref 23–31)
CREAT SERPL-MCNC: 1.62 MG/DL (ref 0.73–1.18)
EOSINOPHIL # BLD AUTO: 0.1 X10(3)/MCL (ref 0–0.9)
EOSINOPHIL NFR BLD AUTO: 1.2 %
ERYTHROCYTE [DISTWIDTH] IN BLOOD BY AUTOMATED COUNT: 14.4 % (ref 11.5–17)
GLOBULIN SER-MCNC: 3.2 GM/DL (ref 2.4–3.5)
GLUCOSE SERPL-MCNC: 80 MG/DL (ref 82–115)
HCT VFR BLD AUTO: 51.9 % (ref 42–52)
HGB BLD-MCNC: 16.1 GM/DL (ref 14–18)
LYMPHOCYTES # BLD AUTO: 0.5 X10(3)/MCL (ref 0.6–4.6)
LYMPHOCYTES NFR BLD AUTO: 4.6 %
MCH RBC QN AUTO: 30.8 PG (ref 27–31)
MCHC RBC AUTO-ENTMCNC: 31 GM/DL (ref 33–36)
MCV RBC AUTO: 99.2 FL (ref 80–94)
MONOCYTES # BLD AUTO: 0.8 X10(3)/MCL (ref 0.1–1.3)
MONOCYTES NFR BLD AUTO: 7.4 %
NEUTROPHILS # BLD AUTO: 9.3 X10(3)/MCL (ref 2.1–9.2)
NEUTROPHILS NFR BLD AUTO: 85.1 %
PLATELET # BLD AUTO: 153 X10(3)/MCL (ref 130–400)
PMV BLD AUTO: 10.5 FL (ref 9.4–12.4)
POTASSIUM SERPL-SCNC: 4.8 MMOL/L (ref 3.5–5.1)
PROT SERPL-MCNC: 6.8 GM/DL (ref 5.8–7.6)
PSA SERPL-MCNC: 395.83 NG/ML
RBC # BLD AUTO: 5.23 X10(6)/MCL (ref 4.7–6.1)
SODIUM SERPL-SCNC: 140 MMOL/L (ref 136–145)
WBC # SPEC AUTO: 10.9 X10(3)/MCL (ref 4.5–11.5)

## 2021-01-09 LAB — RAPID GROUP A STREP (OHS): NEGATIVE

## 2021-01-19 ENCOUNTER — HISTORICAL (OUTPATIENT)
Dept: INFUSION THERAPY | Facility: HOSPITAL | Age: 86
End: 2021-01-19

## 2021-01-19 LAB
ABS NEUT (OLG): 7.56 X10(3)/MCL (ref 2.1–9.2)
ALBUMIN SERPL-MCNC: 3.6 GM/DL (ref 3.4–4.8)
ALP SERPL-CCNC: 203 UNIT/L (ref 40–150)
ALT SERPL-CCNC: 9 UNIT/L (ref 0–55)
ANION GAP SERPL CALC-SCNC: 13 MMOL/L
AST SERPL-CCNC: 24 UNIT/L (ref 5–34)
BASOPHILS # BLD AUTO: 0 X10(3)/MCL (ref 0–0.2)
BASOPHILS NFR BLD AUTO: 0.3 %
BILIRUB SERPL-MCNC: 1 MG/DL
BILIRUBIN DIRECT+TOT PNL SERPL-MCNC: 0.4 MG/DL (ref 0–0.5)
BILIRUBIN DIRECT+TOT PNL SERPL-MCNC: 0.6 MG/DL (ref 0–0.8)
BUN SERPL-MCNC: 36 MG/DL (ref 8–26)
CHLORIDE SERPL-SCNC: 103 MMOL/L (ref 98–109)
CREAT SERPL-MCNC: 1.9 MG/DL (ref 0.6–1.3)
EOSINOPHIL # BLD AUTO: 0.1 X10(3)/MCL (ref 0–0.9)
EOSINOPHIL NFR BLD AUTO: 1.3 %
ERYTHROCYTE [DISTWIDTH] IN BLOOD BY AUTOMATED COUNT: 14.7 % (ref 11.5–17)
GLUCOSE SERPL-MCNC: 90 MG/DL (ref 70–105)
HCT VFR BLD AUTO: 51.5 % (ref 42–52)
HCT VFR BLD CALC: 48 % (ref 38–51)
HGB BLD-MCNC: 15.7 GM/DL (ref 14–18)
HGB BLD-MCNC: 16.3 MG/DL (ref 12–17)
LIVER PROFILE INTERP: ABNORMAL
LYMPHOCYTES # BLD AUTO: 0.4 X10(3)/MCL (ref 0.6–4.6)
LYMPHOCYTES NFR BLD AUTO: 4.4 %
MCH RBC QN AUTO: 31 PG (ref 27–31)
MCHC RBC AUTO-ENTMCNC: 30.5 GM/DL (ref 33–36)
MCV RBC AUTO: 101.6 FL (ref 80–94)
MONOCYTES # BLD AUTO: 0.5 X10(3)/MCL (ref 0.1–1.3)
MONOCYTES NFR BLD AUTO: 5.5 %
NEUTROPHILS # BLD AUTO: 7.6 X10(3)/MCL (ref 2.1–9.2)
NEUTROPHILS NFR BLD AUTO: 86.7 %
PLATELET # BLD AUTO: 182 X10(3)/MCL (ref 130–400)
PMV BLD AUTO: 10.3 FL (ref 9.4–12.4)
POC IONIZED CALCIUM: 1.14 MMOL/L (ref 1.12–1.32)
POC TCO2: 30 MMOL/L (ref 24–29)
POTASSIUM BLD-SCNC: 4.7 MMOL/L (ref 3.5–4.9)
PROT SERPL-MCNC: 6.6 GM/DL (ref 5.8–7.6)
PSA SERPL-MCNC: 402.67 NG/ML
RBC # BLD AUTO: 5.07 X10(6)/MCL (ref 4.7–6.1)
SODIUM BLD-SCNC: 140 MMOL/L (ref 138–146)
WBC # SPEC AUTO: 8.7 X10(3)/MCL (ref 4.5–11.5)

## 2021-02-24 ENCOUNTER — HISTORICAL (OUTPATIENT)
Dept: INFUSION THERAPY | Facility: HOSPITAL | Age: 86
End: 2021-02-24

## 2021-02-24 LAB
ABS NEUT (OLG): 7.2 X10(3)/MCL (ref 2.1–9.2)
ALBUMIN SERPL-MCNC: 3.6 GM/DL (ref 3.4–4.8)
ALBUMIN/GLOB SERPL: 1.1 RATIO (ref 1.1–2)
ALP SERPL-CCNC: 233 UNIT/L (ref 40–150)
ALT SERPL-CCNC: 6 UNIT/L (ref 0–55)
ANION GAP SERPL CALC-SCNC: 12 MMOL/L
AST SERPL-CCNC: 24 UNIT/L (ref 5–34)
BASOPHILS # BLD AUTO: 0 X10(3)/MCL (ref 0–0.2)
BASOPHILS NFR BLD AUTO: 0.2 %
BILIRUB SERPL-MCNC: 1.8 MG/DL
BILIRUBIN DIRECT+TOT PNL SERPL-MCNC: 0.6 MG/DL (ref 0–0.5)
BILIRUBIN DIRECT+TOT PNL SERPL-MCNC: 1.2 MG/DL (ref 0–0.8)
BUN SERPL-MCNC: 34.9 MG/DL (ref 8.4–25.7)
BUN SERPL-MCNC: 38 MG/DL (ref 8–26)
CALCIUM SERPL-MCNC: 8.9 MG/DL (ref 8.8–10)
CHLORIDE SERPL-SCNC: 101 MMOL/L (ref 98–107)
CHLORIDE SERPL-SCNC: 103 MMOL/L (ref 98–109)
CO2 SERPL-SCNC: 25 MMOL/L (ref 23–31)
CREAT SERPL-MCNC: 1.8 MG/DL (ref 0.6–1.3)
CREAT SERPL-MCNC: 1.81 MG/DL (ref 0.73–1.18)
EOSINOPHIL # BLD AUTO: 0 X10(3)/MCL (ref 0–0.9)
EOSINOPHIL NFR BLD AUTO: 0.6 %
ERYTHROCYTE [DISTWIDTH] IN BLOOD BY AUTOMATED COUNT: 14.3 % (ref 11.5–17)
GLOBULIN SER-MCNC: 3.3 GM/DL (ref 2.4–3.5)
GLUCOSE SERPL-MCNC: 95 MG/DL (ref 82–115)
GLUCOSE SERPL-MCNC: 96 MG/DL (ref 70–105)
HCT VFR BLD AUTO: 52.6 % (ref 42–52)
HCT VFR BLD CALC: 53 % (ref 38–51)
HGB BLD-MCNC: 16.3 GM/DL (ref 14–18)
HGB BLD-MCNC: 18 MG/DL (ref 12–17)
LYMPHOCYTES # BLD AUTO: 0.4 X10(3)/MCL (ref 0.6–4.6)
LYMPHOCYTES NFR BLD AUTO: 4.4 %
MCH RBC QN AUTO: 31.3 PG (ref 27–31)
MCHC RBC AUTO-ENTMCNC: 31 GM/DL (ref 33–36)
MCV RBC AUTO: 101 FL (ref 80–94)
MONOCYTES # BLD AUTO: 0.3 X10(3)/MCL (ref 0.1–1.3)
MONOCYTES NFR BLD AUTO: 3.3 %
NEUTROPHILS # BLD AUTO: 7.2 X10(3)/MCL (ref 2.1–9.2)
NEUTROPHILS NFR BLD AUTO: 89.2 %
PLATELET # BLD AUTO: 214 X10(3)/MCL (ref 130–400)
PMV BLD AUTO: 10.7 FL (ref 9.4–12.4)
POC IONIZED CALCIUM: 1.19 MMOL/L (ref 1.12–1.32)
POC TCO2: 31 MMOL/L (ref 24–29)
POTASSIUM BLD-SCNC: 5.9 MMOL/L (ref 3.5–4.9)
POTASSIUM SERPL-SCNC: 6 MMOL/L (ref 3.5–5.1)
PROT SERPL-MCNC: 6.9 GM/DL (ref 5.8–7.6)
PSA SERPL-MCNC: 419.5 NG/ML
RBC # BLD AUTO: 5.21 X10(6)/MCL (ref 4.7–6.1)
SODIUM BLD-SCNC: 139 MMOL/L (ref 138–146)
SODIUM SERPL-SCNC: 140 MMOL/L (ref 136–145)
WBC # SPEC AUTO: 8.1 X10(3)/MCL (ref 4.5–11.5)

## 2021-03-24 ENCOUNTER — HISTORICAL (OUTPATIENT)
Dept: INFUSION THERAPY | Facility: HOSPITAL | Age: 86
End: 2021-03-24

## 2021-03-24 LAB
ABS NEUT (OLG): 6.49 X10(3)/MCL (ref 2.1–9.2)
ALBUMIN SERPL-MCNC: 3.6 GM/DL (ref 3.4–4.8)
ALBUMIN/GLOB SERPL: 1.2 RATIO (ref 1.1–2)
ALP SERPL-CCNC: 298 UNIT/L (ref 40–150)
ALT SERPL-CCNC: 10 UNIT/L (ref 0–55)
AST SERPL-CCNC: 31 UNIT/L (ref 5–34)
BASOPHILS # BLD AUTO: 0 X10(3)/MCL (ref 0–0.2)
BASOPHILS NFR BLD AUTO: 0.3 %
BILIRUB SERPL-MCNC: 1.2 MG/DL
BILIRUBIN DIRECT+TOT PNL SERPL-MCNC: 0.5 MG/DL (ref 0–0.5)
BILIRUBIN DIRECT+TOT PNL SERPL-MCNC: 0.7 MG/DL (ref 0–0.8)
BUN SERPL-MCNC: 29.6 MG/DL (ref 8.4–25.7)
CALCIUM SERPL-MCNC: 8.9 MG/DL (ref 8.8–10)
CHLORIDE SERPL-SCNC: 100 MMOL/L (ref 98–107)
CO2 SERPL-SCNC: 31 MMOL/L (ref 23–31)
CREAT SERPL-MCNC: 1.58 MG/DL (ref 0.73–1.18)
EOSINOPHIL # BLD AUTO: 0.2 X10(3)/MCL (ref 0–0.9)
EOSINOPHIL NFR BLD AUTO: 2.4 %
ERYTHROCYTE [DISTWIDTH] IN BLOOD BY AUTOMATED COUNT: 14 % (ref 11.5–17)
GLOBULIN SER-MCNC: 2.9 GM/DL (ref 2.4–3.5)
GLUCOSE SERPL-MCNC: 93 MG/DL (ref 82–115)
HCT VFR BLD AUTO: 50 % (ref 42–52)
HGB BLD-MCNC: 15.7 GM/DL (ref 14–18)
LYMPHOCYTES # BLD AUTO: 0.5 X10(3)/MCL (ref 0.6–4.6)
LYMPHOCYTES NFR BLD AUTO: 6.2 %
MCH RBC QN AUTO: 31.8 PG (ref 27–31)
MCHC RBC AUTO-ENTMCNC: 31.4 GM/DL (ref 33–36)
MCV RBC AUTO: 101.2 FL (ref 80–94)
MONOCYTES # BLD AUTO: 0.6 X10(3)/MCL (ref 0.1–1.3)
MONOCYTES NFR BLD AUTO: 7.3 %
NEUTROPHILS # BLD AUTO: 6.5 X10(3)/MCL (ref 2.1–9.2)
NEUTROPHILS NFR BLD AUTO: 81.7 %
PLATELET # BLD AUTO: 172 X10(3)/MCL (ref 130–400)
PMV BLD AUTO: 10.7 FL (ref 9.4–12.4)
POTASSIUM SERPL-SCNC: 4.9 MMOL/L (ref 3.5–5.1)
PROT SERPL-MCNC: 6.5 GM/DL (ref 5.8–7.6)
PSA SERPL-MCNC: 442.04 NG/ML
RBC # BLD AUTO: 4.94 X10(6)/MCL (ref 4.7–6.1)
SODIUM SERPL-SCNC: 140 MMOL/L (ref 136–145)
WBC # SPEC AUTO: 7.9 X10(3)/MCL (ref 4.5–11.5)

## 2021-04-19 ENCOUNTER — HISTORICAL (OUTPATIENT)
Dept: ANESTHESIOLOGY | Facility: HOSPITAL | Age: 86
End: 2021-04-19

## 2021-04-21 ENCOUNTER — HISTORICAL (OUTPATIENT)
Dept: ADMINISTRATIVE | Facility: HOSPITAL | Age: 86
End: 2021-04-21

## 2021-04-21 LAB
ABS NEUT (OLG): 6.8 X10(3)/MCL (ref 2.1–9.2)
ALBUMIN SERPL-MCNC: 3.5 GM/DL (ref 3.4–4.8)
ALBUMIN/GLOB SERPL: 1.2 RATIO (ref 1.1–2)
ALP SERPL-CCNC: 317 UNIT/L (ref 40–150)
ALT SERPL-CCNC: 8 UNIT/L (ref 0–55)
AST SERPL-CCNC: 31 UNIT/L (ref 5–34)
BASOPHILS # BLD AUTO: 0 X10(3)/MCL (ref 0–0.2)
BASOPHILS NFR BLD AUTO: 0.1 %
BILIRUB SERPL-MCNC: 0.9 MG/DL
BILIRUBIN DIRECT+TOT PNL SERPL-MCNC: 0.4 MG/DL (ref 0–0.5)
BILIRUBIN DIRECT+TOT PNL SERPL-MCNC: 0.5 MG/DL (ref 0–0.8)
BUN SERPL-MCNC: 40.4 MG/DL (ref 8.4–25.7)
CALCIUM SERPL-MCNC: 8.9 MG/DL (ref 8.8–10)
CHLORIDE SERPL-SCNC: 103 MMOL/L (ref 98–107)
CO2 SERPL-SCNC: 27 MMOL/L (ref 23–31)
CREAT SERPL-MCNC: 1.74 MG/DL (ref 0.73–1.18)
EOSINOPHIL # BLD AUTO: 0 X10(3)/MCL (ref 0–0.9)
EOSINOPHIL NFR BLD AUTO: 0.1 %
ERYTHROCYTE [DISTWIDTH] IN BLOOD BY AUTOMATED COUNT: 14 % (ref 11.5–17)
GLOBULIN SER-MCNC: 3 GM/DL (ref 2.4–3.5)
GLUCOSE SERPL-MCNC: 100 MG/DL (ref 82–115)
HCT VFR BLD AUTO: 50.2 % (ref 42–52)
HGB BLD-MCNC: 15.5 GM/DL (ref 14–18)
LYMPHOCYTES # BLD AUTO: 0.4 X10(3)/MCL (ref 0.6–4.6)
LYMPHOCYTES NFR BLD AUTO: 5.4 %
MCH RBC QN AUTO: 31.2 PG (ref 27–31)
MCHC RBC AUTO-ENTMCNC: 30.9 GM/DL (ref 33–36)
MCV RBC AUTO: 101 FL (ref 80–94)
MONOCYTES # BLD AUTO: 0.5 X10(3)/MCL (ref 0.1–1.3)
MONOCYTES NFR BLD AUTO: 6.4 %
NEUTROPHILS # BLD AUTO: 6.8 X10(3)/MCL (ref 2.1–9.2)
NEUTROPHILS NFR BLD AUTO: 86.1 %
PLATELET # BLD AUTO: 197 X10(3)/MCL (ref 130–400)
PMV BLD AUTO: 11.1 FL (ref 9.4–12.4)
POTASSIUM SERPL-SCNC: 4.8 MMOL/L (ref 3.5–5.1)
PROT SERPL-MCNC: 6.5 GM/DL (ref 5.8–7.6)
PSA SERPL-MCNC: 518.6 NG/ML
RBC # BLD AUTO: 4.97 X10(6)/MCL (ref 4.7–6.1)
SODIUM SERPL-SCNC: 141 MMOL/L (ref 136–145)
WBC # SPEC AUTO: 7.9 X10(3)/MCL (ref 4.5–11.5)

## 2021-06-02 ENCOUNTER — HISTORICAL (OUTPATIENT)
Dept: ADMINISTRATIVE | Facility: HOSPITAL | Age: 86
End: 2021-06-02

## 2021-06-02 LAB
ABS NEUT (OLG): 3.02 X10(3)/MCL (ref 2.1–9.2)
ALBUMIN SERPL-MCNC: 3.2 GM/DL (ref 3.4–4.8)
ALBUMIN/GLOB SERPL: 1.1 RATIO (ref 1.1–2)
ALP SERPL-CCNC: 255 UNIT/L (ref 40–150)
ALT SERPL-CCNC: 7 UNIT/L (ref 0–55)
AST SERPL-CCNC: 29 UNIT/L (ref 5–34)
BASOPHILS # BLD AUTO: 0 X10(3)/MCL (ref 0–0.2)
BASOPHILS NFR BLD AUTO: 1.1 %
BILIRUB SERPL-MCNC: 0.7 MG/DL
BILIRUBIN DIRECT+TOT PNL SERPL-MCNC: 0.3 MG/DL (ref 0–0.5)
BILIRUBIN DIRECT+TOT PNL SERPL-MCNC: 0.4 MG/DL (ref 0–0.8)
BUN SERPL-MCNC: 38.5 MG/DL (ref 8.4–25.7)
CALCIUM SERPL-MCNC: 9 MG/DL (ref 8.8–10)
CHLORIDE SERPL-SCNC: 103 MMOL/L (ref 98–107)
CO2 SERPL-SCNC: 29 MMOL/L (ref 23–31)
CREAT SERPL-MCNC: 1.74 MG/DL (ref 0.73–1.18)
EOSINOPHIL # BLD AUTO: 0.2 X10(3)/MCL (ref 0–0.9)
EOSINOPHIL NFR BLD AUTO: 4.3 %
ERYTHROCYTE [DISTWIDTH] IN BLOOD BY AUTOMATED COUNT: 13.5 % (ref 11.5–17)
GLOBULIN SER-MCNC: 2.9 GM/DL (ref 2.4–3.5)
GLUCOSE SERPL-MCNC: 109 MG/DL (ref 82–115)
HCT VFR BLD AUTO: 41.7 % (ref 42–52)
HGB BLD-MCNC: 13 GM/DL (ref 14–18)
LYMPHOCYTES # BLD AUTO: 0.5 X10(3)/MCL (ref 0.6–4.6)
LYMPHOCYTES NFR BLD AUTO: 11.1 %
MCH RBC QN AUTO: 31.1 PG (ref 27–31)
MCHC RBC AUTO-ENTMCNC: 31.2 GM/DL (ref 33–36)
MCV RBC AUTO: 99.8 FL (ref 80–94)
MONOCYTES # BLD AUTO: 0.6 X10(3)/MCL (ref 0.1–1.3)
MONOCYTES NFR BLD AUTO: 14.3 %
NEUTROPHILS # BLD AUTO: 3 X10(3)/MCL (ref 2.1–9.2)
NEUTROPHILS NFR BLD AUTO: 68.5 %
PLATELET # BLD AUTO: 192 X10(3)/MCL (ref 130–400)
PMV BLD AUTO: 10.6 FL (ref 9.4–12.4)
POTASSIUM SERPL-SCNC: 5 MMOL/L (ref 3.5–5.1)
PROT SERPL-MCNC: 6.1 GM/DL (ref 5.8–7.6)
PSA SERPL-MCNC: 687.87 NG/ML
RBC # BLD AUTO: 4.18 X10(6)/MCL (ref 4.7–6.1)
SODIUM SERPL-SCNC: 142 MMOL/L (ref 136–145)
WBC # SPEC AUTO: 4.4 X10(3)/MCL (ref 4.5–11.5)

## 2022-04-11 ENCOUNTER — HISTORICAL (OUTPATIENT)
Dept: ADMINISTRATIVE | Facility: HOSPITAL | Age: 87
End: 2022-04-11

## 2022-04-27 VITALS
WEIGHT: 207.25 LBS | SYSTOLIC BLOOD PRESSURE: 156 MMHG | HEIGHT: 72 IN | DIASTOLIC BLOOD PRESSURE: 91 MMHG | BODY MASS INDEX: 28.07 KG/M2 | OXYGEN SATURATION: 98 %

## 2022-04-30 NOTE — PROGRESS NOTES
Patient:   Aldo Hernandez            MRN: [id number]            FIN: [id number]               Age:   87 years     Sex:  Male     :  11/15/1933   Associated Diagnoses:   None   Author:   Ketan Edwards        Referring Physician:  Azael Rodgers MD      Visit Information     Problem List:   1.  Metastatic castrate resistant prostate cancer.    Treatment history:  1.  Previously taking Xtandi and prednisone.     Current Treatment:   1.  Lupron every 6 months.    2.  Testosterone injections every 4 weeks starting 2020.      Diagnosis/Treatment/HPI:   1.  Patient was diagnosed with prostate cancer and underwent a prostatectomy in , pathology showed a [place] score of 3+3 equal 6.  There was no extracapsular extension, no SVI, and surgical margins were negative.  9 pelvic lymph nodes were removed and all were negative.  His postoperative PSA was undetectable.  2.  In , the patient's PSA increased from 0.2-1.27.  He was started on Lupron therapy in 2009, Casodex added in 2010.  He continued to have a rise in his PSA and he underwent imaging, and a CT of the abdomen and pelvis on 2012 showed a 2 x 3 cm area of recurrent disease within the postoperative bed.  The patient underwent 39 fractions of radiation from 2012 to 2012 with Dr. Azael Rodgers.  3.  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  He was treated with EBRT to the mediastinum from 3/6/2018 to 3/19/2018.  4.  He was then started Xtandi and prednisone in 2018.  5.  He then underwent a CT scan of the chest, abdomen, and pelvis on 2019 that showed interval enlargement of multiple mediastinal nodes outside of his previous radiation fields, was treated again with a EBRT to the mediastinum on  6/12/2019 to 6/26/2019.  6.  The patient's PSA continued to go up and was 54 in December 2019, therefore underwent a CT of the chest, abdomen, and pelvis on 1/9/2020 that showed interval enlargement of mediastinal lymphadenopathy, however this was compared to a PET/CT from 1/16/2018 and not his most recent CT from May 2019.  He also had a bone scan on 1/9/2020 that showed increased area of uptake in the left acetabulum, left superior pubic ramus, and pubic symphysis.  7.  CT of the chest, abdomen, and pelvis on 5/21/2020 showed enlarging mediastinal adenopathy especially in the high paratracheal region of the right.  There was a new metastatic lesion within the liver straddling segment IV/VIII.  There was more conspicuous sclerosis of the left pubic ramus and acetabulum as well as new area of sclerosis involving the T6 vertebral body.  Findings are suggestive of metastatic disease.    8.  Seen by Dr. Cole on 5/22/2020, due to liver mets patient not a candidate for radium 223 or radiation.    9.  Bone scan on 5/26/2020 showed new metastatic lesions involving T6 vertebral body in the right acetabulum.  Also there was progressed metastatic involvement in the left acetabulum and the left pubic rami.  PSA on 6/4/2020 was 332.86.   10.  Testosterone injections, 400 mg IM given every 4 weeks with the first dose on 7/7/2020.      Chief Complaint   12/22/2020 9:32 CST      No c/o        Interval History   Patient here for follow-up visit for his metastatic prostate cancer. He is on Lupron as well as monthly testosterone injections. His PSA level has been stable, pending today. He continues with chronic symptoms, not worsening. He has HH coming every other day to apply compression dressings to bilateral lower extremities which is helping. He has occasional pains to his hips and lower back and takes Norco daily. No new symptoms.  Denies HA, SOB, CP, N/V/C/D.  Denies new neurological symptoms.       Review of Systems   14  point review of systems done in full with pertinent positives as described above  Remainder of review systems done in full and unremarkable.      Health Status   Allergies:    Allergic Reactions (Selected)  No Known Allergies   Current medications:  (Selected)   Outpatient Medications  Ordered  Versed: 1 mg, form: Injection, IV Push, Once, first dose 02/14/18 11:00:00 CST, stop date 02/14/18 11:00:00 CST, 1 to 2 mg initial then 1 mg every 2 min until sedation level 2 achieved  Versed: 2 mg, form: Injection, IV Slow, Once, first dose 02/14/18 9:00:00 CST, stop date 02/14/18 9:00:00 CST, give 1mg IVP repeat in 5min if still anxious for a total of 2mg.  Future  Testosterone Cypionate 200 mg/mL intramuscular solution: 400 mg, form: Soln, IM, Once-chemo, first dose 12/22/20 9:40:00 CST, stop date 12/22/20 9:40:00 CST, Routine, Weeks 21, Future Order  Prescriptions  Prescribed  Percocet 5/325 oral tablet: 1 tab(s), Oral, q6hr, PRN PRN for pain, quantity medically necessary, # 120 tab(s), 0 Refill(s), Pharmacy: [facility]/pharmacy #[id number], 184, cm, Height/Length Dosing, 06/19/20 9:50:00 CDT, 89.8, kg, Weight Dosing, 06/19/20 9:50:00 CDT  Documented Medications  Documented  Aleve 220 mg oral tablet: 440 mg = 2 tab(s), Oral, q8hr, PRN PRN for pain, # 60 tab(s), 0 Refill(s)  Centrum Silver Ultra Men's: 1 tab(s), Oral, Daily, 0 Refill(s)  Citracal Maximum + D oral tablet: 1 tab(s), Oral, Daily, # 60 tab(s), 0 Refill(s)  Pradaxa 75 mg oral capsule: 75 mg = 1 cap(s), Oral, BID, 0 Refill(s)  Probiotic Formula (Bacillus Coagulans): tab 1, Oral, Daily, 0 Refill(s)  Vitamin D3: 5,000 units = 5 tab(s), Oral, Daily, 0 Refill(s)  acetaminophen-hydrocodone 325 mg-5 mg oral tablet: tab(s), Oral, q4-6hr  alendronate 70 mg oral tablet: 70 mg = 1 tab(s), Oral, qWeek  cephalexin 500 mg oral capsule: 500 mg = 1 cap(s), Oral, BID  furosemide 40 mg oral tablet: 40 mg = 1 tab(s), Oral, Daily  pravastatin 40 mg oral tablet: 40 mg = 1 tab(s), Oral, qAM, #  90 tab(s), 0 Refill(s)  prednisONE 10 mg oral tablet: 10 mg = 1 tab(s), Oral, Daily  sotalol 80 mg oral tablet: 1/2 tab, Oral, BID, 0 Refill(s)      Histories   Past Medical History:    Active  Hypertension (18974342)  Hypercholesterolemia (52844004)  Ex-smoker (17387334)  Cancer of prostate (1477302216)  radiation therapy 1 yr ago ([id number])  Resolved  A-Fib ([id number]):  Resolved.  Comments:  10/23/2013 CDT 8:37 CDT - Jose Manuel GUDINO, Mary ALVES  newly diagnosed- appt scheduled to see cardiologist( per [facility])   Family History:    Myocardial infarct  Father  Hodgkins disease.....  Father     Procedure history:    Bronchoscopy w/ Needle Aspir Biopsy(s) on 2/14/2018 at 84 Years.  Comments:  2/14/2018 11:40 RADHA Zheng RRT, Madie L  auto-populated from documented surgical case  Bronchoscopy, Ebus, 2 or Less Samples on 2/14/2018 at 84 Years.  Comments:  2/14/2018 11:40 RADHA Zheng RRT, Madie L  auto-populated from documented surgical case  Cardioversion (., None) on 12/18/2013 at 80 Years.  Comments:  12/18/2013 7:09 Anthony Eli RN  auto-populated from documented surgical case  Total Knee Arthroplasty (Left) on 11/4/2013 at 79 Years.  Comments:  11/4/2013 12:33 RADHA Walters RN, Sandy  auto-populated from documented surgical case  Prostatectomy ([id number]).  Tonsillectomy with adenoidectomy ([id number]).  Appendectomy ([id number]).  pacemaker.  colonoscopy.      Physical Examination   Vital Signs   12/22/2020 9:32 CST      Temperature Oral          36.7 DegC                             Temperature Oral (calculated)             98.06 DegF                             Peripheral Pulse Rate     60 bpm                             SpO2                      96 %                             Systolic Blood Pressure   161 mmHg  HI                             Diastolic Blood Pressure  89 mmHg     General:  Alert and oriented, No acute distress, Pleasant, elderly male.         Ambulation status: Assistive  devices ( Cane ).    Eye:  Extraocular movements are intact, Normal conjunctiva, Vision unchanged.    HENT:  Normocephalic, Normal hearing, Oral mucosa is moist.    Neck:  Supple, Non-tender, No lymphadenopathy.    Respiratory:  Lungs are clear to auscultation, Respirations are non-labored, Breath sounds are equal.    Cardiovascular:  Normal rate, Regular rhythm, bilateral lower extremity edema- 2+ ; compression wrap stockings in place.    Gastrointestinal:  Soft, Non-tender, Non-distended, Normal bowel sounds.    Integumentary:  Warm, Dry, Pink, Intact.    Neurologic:  Alert, Oriented, Normal sensory, No focal deficits, Walks with a cane.    Psychiatric:  Cooperative, Appropriate mood & affect.    Genitourinary:  incontinent, wears depend undergarmet.    Lymphatics:  No lymphadenopathy neck, axilla, groin.    Musculoskeletal:  No deformity,      Mobility/ gait: Able to walk with minimal assistance, Able to walk with a cane, Geriatric stiffness.    Cognition and Speech:  Oriented, Speech clear and coherent.    ECOG Performance Scale: 2 - Capable of all self-care but unable to carry out any work activities. Up and about greater than 50 percent of waking hours.      Review / Management   Results review:  Lab results   12/22/2020 9:12 CST      WBC                       10.9 x10(3)/mcL                             RBC                       5.23 x10(6)/mcL                             Hgb                       16.1 gm/dL                             Hct                       51.9 %                             Platelet                  153 x10(3)/mcL                             MCV                       99.2 fL  HI                             MCH                       30.8 pg                             MCHC                      31.0 gm/dL  LOW                             RDW                       14.4 %                             MPV                       10.5 fL                             Abs Neut                  9.29  x10(3)/mcL  HI                             NEUT%                     85.1 %  NA                             NEUT#                     9.3 x10(3)/mcL  HI                             LYMPH%                    4.6 %  NA                             LYMPH#                    0.5 x10(3)/mcL  LOW                             MONO%                     7.4 %  NA                             MONO#                     0.8 x10(3)/mcL                             EOS%                      1.2 %  NA                             EOS#                      0.1 x10(3)/mcL                             BASO%                     0.3 %  NA                             BASO#                     0.0 x10(3)/mcL  .       Impression and Plan   Diagnoses:  1.  Metastatic prostate cancer with metastatic disease to the bones, liver, and lymph nodes.    Plan:  We had previously discussed at length the possibility of treating with chemotherapy, however due to the fact that the patient is 87 years old and does have some baseline weakness, do not feel that chemotherapy would be the best option for him.  He did undergo patient education, however while at that visit, I discussed starting testosterone injections, 400 mg IM given every 4 weeks.  The goal of this is to control his symptoms and decrease his PSA.    His PSA has been decreasing. Pending from today.  Will only repeat scans if PSA begins to rise.   - Continue testosterone q 4 weeks, due today. Ok to proceed.   Patient is aware that if/when the testosterone stopped working, I would not proceed with chemotherapy.    Continue scheduled follow-up appointments with physicians - Cardiology and primary care.     RTC in 4 weeks for OV and clinical examination.   CBC ,CMP, PSA prior to appt    All questions answered at this time.       ALF Burrell

## 2022-04-30 NOTE — PROGRESS NOTES
Oncology Office Visit      Patient:   Aldo Hernandez            MRN: [id number]            FIN: [id number]               Age:   86 years     Sex:  Male     :  11/15/1933   Associated Diagnoses:   None   Author:   Aniya Monte NP        Referring Physician:  Azael Rodgers MD      Visit Information     Problem List:   1.  Metastatic castrate resistant prostate cancer.    Treatment history:  1.  Previously taking Xtandi and prednisone    Current Treatment:   1.  Lupron every 6 months.    2.  Testosterone injections every 4 weeks starting 2020.      Diagnosis/Treatment/HPI:   1.  Patient was diagnosed with prostate cancer and underwent a prostatectomy in , pathology showed a [place] score of 3+3 equal 6.  There was no extracapsular extension, no SVI, and surgical margins were negative.  9 pelvic lymph nodes were removed and all were negative.  His postoperative PSA was undetectable.  2.  In , the patient's PSA increased from 0.2-1.27.  He was started on Lupron therapy in 2009, Casodex added in 2010.  He continued to have a rise in his PSA and he underwent imaging, and a CT of the abdomen and pelvis on 2012 showed a 2 x 3 cm area of recurrent disease within the postoperative bed.  The patient underwent 39 fractions of radiation from 2012 to 2012 with Dr. Azael Rodgers.  3.  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  He was treated with EBRT to the mediastinum from 3/6/2018 to 3/19/2018.  4.  He was then started Xtandi and prednisone in 2018.  5.  He then underwent a CT scan of the chest, abdomen, and pelvis on 2019 that showed interval enlargement of multiple mediastinal nodes outside of his previous radiation fields, was treated again with a EBRT  to the mediastinum on 6/12/2019 to 6/26/2019.  6.  The patient's PSA continued to go up and was 54 in December 2019, therefore underwent a CT of the chest, abdomen, and pelvis on 1/9/2020 that showed interval enlargement of mediastinal lymphadenopathy, however this was compared to a PET/CT from 1/16/2018 and not his most recent CT from May 2019.  He also had a bone scan on 1/9/2020 that showed increased area of uptake in the left acetabulum, left superior pubic ramus, and pubic symphysis.  7.  CT of the chest, abdomen, and pelvis on 5/21/2020 showed enlarging mediastinal adenopathy especially in the high paratracheal region of the right.  There was a new metastatic lesion within the liver straddling segment IV/VIII.  There was more conspicuous sclerosis of the left pubic ramus and acetabulum as well as new area of sclerosis involving the T6 vertebral body.  Findings are suggestive of metastatic disease.    8.  Seen by Dr. Cole on 5/22/2020, due to liver mets patient not a candidate for radium 223 or radiation.    9.  Bone scan on 5/26/2020 showed new metastatic lesions involving T6 vertebral body in the right acetabulum.  Also there was progressed metastatic involvement in the left acetabulum and the left pubic rami.  PSA on 6/4/2020 was 332.86.   10.  Testosterone injections, 400 mg IM given every 4 weeks with the first dose on 7/7/2020.      Chief Complaint   10/26/2020 13:20 CDT     Will have bunionectomy Nov 6 2020 with Dr. Ulloa        Interval History   Patient here for follow-up visit. He is on Lupron as well as monthly testosterone injections. His PSA is elevated, however, it is stable, not increasing. He is scheduled for bunionectomy on 11/6/20. He has been evaluated by cardiology with the ok. He complains of frequent urination. Takes lasix 20mg daily. He does not have to get up in the middle of the night to urinate. He has been having this for quite a long time.   Denies HA, SOB, CP, N/V/C/D. Denies new  pain/discomfort. Denies new neurological symptoms.              Review of Systems   14 point review of systems done in full with pertinent positives as described above  Remainder of review systems done in full and unremarkable.      Health Status   Allergies:    Allergic Reactions (All)  No Known Allergies,    Allergies (1) Active Reaction  No Known Allergies None Documented     Current medications:  (Selected)   Outpatient Medications  Ordered  Versed: 1 mg, 1 mL, IV Push, Once  Versed: 2 mg, 2 mL, IV Slow, Once  Future  Testosterone Cypionate 200 mg/mL intramuscular solution: 400 mg, 2 mL, IM, Once-chemo, Weeks 13  Testosterone Cypionate 200 mg/mL intramuscular solution: 400 mg, 2 mL, IM, Once-chemo, Weeks 17  Testosterone Cypionate 200 mg/mL intramuscular solution: 400 mg, 2 mL, IM, Once-chemo, Weeks 21  Prescriptions  Prescribed  Percocet 5/325 oral tablet: 1 tab(s), Oral, q6hr, quantity medically necessary, PRN: for pain, 120 tab(s), 0 Refill(s)  Documented Medications  Documented  Aleve 220 mg oral tablet: 440 mg, 2 tab(s), Oral, q8hr, PRN: for pain, 60 tab(s), 0 Refill(s)  Centrum Silver Ultra Men's: 1 tab(s), Oral, Daily  Citracal Maximum + D oral tablet: 1 tab(s), Oral, Daily, 60 tab(s)  Pradaxa 75 mg oral capsule: 75 mg, 1 cap(s), Oral, BID, 0 Refill(s)  Probiotic Formula (Bacillus Coagulans): tab 1, Oral, Daily, 0 Refill(s)  Vitamin D3: 5,000 units, 5 tab(s), Oral, Daily, 0 Refill(s)  acetaminophen-hydrocodone 325 mg-5 mg oral tablet: tab(s), Oral, q4-6hr  alendronate 70 mg oral tablet: 70 mg, 1 tab(s), Oral, qWeek  cephalexin 500 mg oral capsule: 500 mg, 1 cap(s), Oral, BID  furosemide 40 mg oral tablet: 40 mg, 1 tab(s), Oral, Daily  pravastatin 40 mg oral tablet: 40 mg, 1 tab(s), Oral, qAM, 90 tab(s), 0 Refill(s)  prednisONE 10 mg oral tablet: 10 mg, 1 tab(s), Oral, Daily  sotalol 80 mg oral tablet: 1/2 tab, Oral, BID   Problem list:    All Problems  Cancer of prostate / SNOMED CT 0906600929 /  Confirmed  Degenerative arthritis of right wrist / SNOMED CT 2841150062 / Confirmed  Ex-smoker / SNOMED CT 72195105 / Confirmed  radiation therapy 1 yr ago / SNOMED CT 114839047 / Confirmed  Hypercholesterolemia / SNOMED CT 23353320 / Confirmed  Hypertension / SNOMED CT 07943779 / Confirmed  Obesity / SNOMED CT 5051934559 / Probable  Resolved: Alteration in patterns of urinary elimination / SNOMED CT 20218431  Problem automatically updated based on documentation on Genitourinary Symptoms and/or Urinary Elimination.  Resolved: At risk for falls / SNOMED CT 292926261  Problem automatically updated based on documentation on History of Falls, History of Falls in last 3 months [place], [place] Fall Risk Score and/or ADLs.  Resolved: At risk for impaired skin integrity / SNOMED CT 164881485  Problem automatically updated based on documentation on Skin Abnormality Type, Pressure Ulcer Present on Admission, Oxygen Therapy, Mask/Delivery Type, Pressure Point, and/or a [place] Score of less than 16.  Resolved: At risk for infection / SNOMED CT 667713494  Problem automatically updated based on documentation on Surgical Drain Tube Type and/or Skin Abnormality Type.  Resolved: At risk of pressure sore / SNOMED CT 180707607  Resolved: At risk of UTI / SNOMED CT 684818130  Problem automatically updated based on documentation on Urinary Elimination.  Resolved: A-Fib / SNOMED CT 41377983  newly diagnosed- appt scheduled to see cardiologist( [facility])  Resolved: Bowel dysfunction / SNOMED CT 961861407  Problem added automatically by system based on documentation of Bowel Sounds as Hyperactive, Hyperactive, or Absent;  GI Symptoms as Hyperactive, Constipation, or Diarrhea and/or Passing Flatus as No.  Resolved: Deficient knowledge / SNOMED CT 9449857168  Problem automatically updated based on documentation on Barriers to Learning, Level of Consciousness, Orientation Assessment, or Sensory Deficits.  Resolved: Impaired mobility /  SNOMED CT 712107990  Problem automatically updated based on documentation on Assistive Device, ADLs, Activity Status ADLs, Musculoskeletal Range of Motion, Musculoskeletal Activity, Special Orthopedic Devices, and/or Traction Type.  Resolved: Impaired skin integrity / SNOMED CT 75102251  Problem automatically updated based on documentation on Skin Abnormality Type and/or Pressure Ulcer Present on Admisison.  Resolved: Ineffective airway clearance / SNOMED CT 085507727  Problem automatically updated based on documentation of Shortness of Breath on Respiratory Symptoms; SpO2 less than 92%; Unable to clear secretions or Weak on Cough.,    Active Problems (7)  Cancer of prostate   Degenerative arthritis of right wrist   Ex-smoker   Hypercholesterolemia   Hypertension   Obesity   radiation therapy 1 yr ago         Histories   Past Medical History:    Active  Hypertension (07159299)  Hypercholesterolemia (92564691)  Ex-smoker (72325279)  Cancer of prostate (6244475519)  radiation therapy 1 yr ago (944226095)  Resolved  A-Fib (08316899):  Resolved.  Comments:  10/23/2013 CDT 8:37 CDT - Jose Manuel GUDINO, Mary ALVES  newly diagnosed- appt scheduled to see cardiologist( [facility])   Family History:    Myocardial infarct  Father  Hodgkins disease.....  Father     Procedure history:    Bronchoscopy w/ Needle Aspir Biopsy(s) on 2/14/2018 at 84 Years.  Comments:  2/14/2018 11:40 Madie Barlow RRT  auto-populated from documented surgical case  Bronchoscopy, Ebus, 2 or Less Samples on 2/14/2018 at 84 Years.  Comments:  2/14/2018 11:40 Madie Barlow RRT  auto-populated from documented surgical case  Cardioversion (., None) on 12/18/2013 at 80 Years.  Comments:  12/18/2013 7:09 Anthony Eli RN  auto-populated from documented surgical case  Total Knee Arthroplasty (Left) on 11/4/2013 at 79 Years.  Comments:  11/4/2013 12:33 RADHA Walters RN, Sandy  auto-populated from documented surgical  case  Prostatectomy (730021112).  Tonsillectomy with adenoidectomy (93081849).  Appendectomy (436598978).  pacemaker.  colonoscopy.   Social History        Social & Psychosocial Habits    Alcohol    Comment: occasional wine - 10/23/2013 08:35 - Jose Manuel GUDINO, Mary ALVES    Employment/School  06/06/2017  Status: Retired    Substance Use  10/23/2013 Risk Assessment: Denies Substance Abuse    12/29/2017  Use: Never    Tobacco  10/26/2020  Use: Former smoker, quit more    Patient Wants Consult For Cessation Counseling N/A    Abuse/Neglect  10/26/2020  [facility] Any signs of abuse or neglect No      07/07/2020  [facility] [employer]  .        Physical Examination   Vital Signs   10/26/2020 13:20 CDT     Temperature Oral          36.3 DegC                             Temperature Oral (calculated)             97.34 DegF                             Peripheral Pulse Rate     60 bpm                             SpO2                      95 %                             Oxygen Therapy            Room air                             Systolic Blood Pressure   148 mmHg  HI                             Diastolic Blood Pressure  84 mmHg        Vital Signs (last 24 hrs)_____  Last Charted___________  Temp Oral     36.3 DegC  (OCT 26 13:20)  Heart Rate Peripheral   60 bpm  (OCT 26 13:20)  SBP      H 148mmHg  (OCT 26 13:20)  DBP      84 mmHg  (OCT 26 13:20)  SpO2      95 %  (OCT 26 13:20)  Weight      93.7 kg  (OCT 26 13:20)  Height      184 cm  (OCT 26 13:20)  BMI      27.68  (OCT 26 13:20)     General:  Alert and oriented, No acute distress.    Eye:  Extraocular movements are intact, Normal conjunctiva.    HENT:  Normocephalic, Oral mucosa is moist.    Neck:  Supple, Non-tender, No lymphadenopathy.    Respiratory:  Lungs are clear to auscultation, Respirations are non-labored, Breath sounds are equal.    Cardiovascular:  Normal rate, Regular rhythm, bilateral lower extremity edema- 2+ ; compression stockings in place.     Gastrointestinal:  Soft, Non-tender, Non-distended, Normal bowel sounds.    Integumentary:  Warm, Dry, Pink, Intact.    Neurologic:  Alert, Oriented, Normal sensory, No focal deficits, Walks with a cane.    Psychiatric:  Cooperative, Appropriate mood & affect.    Genitourinary:  incontinent, wears depend undergarmet.    Lymphatics:  No lymphadenopathy neck, axilla, groin.    Cognition and Speech:  Oriented, Speech clear and coherent.    ECOG Performance Scale: 2 - Capable of all self-care but unable to carry out any work activities. Up and about greater than 50 percent of waking hours.      Review / Management   Results review:  Lab results   10/23/2020 12:27 CDT     WBC                       9.9 x10(3)/mcL                             RBC                       4.89 x10(6)/mcL                             Hgb                       15.6 gm/dL                             Hct                       48.5 %                             Platelet                  193 x10(3)/mcL                             MCV                       99.2 fL  HI                             MCH                       31.9 pg  HI                             MCHC                      32.2 gm/dL  LOW                             RDW                       13.2 %                             MPV                       10.8 fL                             Abs Neut                  8.75 x10(3)/mcL                             NEUT%                     88.1 %  NA                             NEUT#                     8.8 x10(3)/mcL                             LYMPH%                    3.4 %  NA                             LYMPH#                    0.3 x10(3)/mcL  LOW                             MONO%                     5.9 %  NA                             MONO#                     0.6 x10(3)/mcL                             EOS%                      0.8 %  NA                             EOS#                      0.1 x10(3)/mcL                              BASO%                     0.2 %  NA                             BASO#                     0.0 x10(3)/mcL  .       Impression and Plan   Diagnoses:  1.  Metastatic prostate cancer with metastatic disease to the bones, liver, and lymph nodes.    Plan:  We had previously discussed at length the possibility of treating with chemotherapy, however due to the fact that the patient is 86 years old and does have some baseline weakness, do not feel that chemotherapy would be the best option for him.  He did undergo patient education, however while at that visit, I discussed starting testosterone injections, 400 mg IM given every 4 weeks.  The goal of this is to control his symptoms and decrease his PSA.    His PSA is stable. Will only repeat scans if PSA begins to rise.     Patient is aware that if/when the testosterone stopped working, I would not proceed with chemotherapy.    Follow up with cardiology and podiatry as scheduled for management of bone spur     RTC in 4 weeks for OV and clinical examination     CBC ,CMP, PSA prior to appt

## 2022-04-30 NOTE — PROGRESS NOTES
Patient:   Aldo Hernandez            MRN: [id number]            FIN: [id number]               Age:   86 years     Sex:  Male     :  11/15/1933   Associated Diagnoses:   None   Author:   Romelia ROTHMAN MD, Philippe E        Referring Physician:  Azael Rodgers MD         Visit Information     Problem List:   1.  Metastatic castrate resistant prostate cancer.    Treatment history:  1.  Taking Xtandi and prednisone, plan on continuing at this time.     Current Treatment:   1.  Lupron every 6 months.    2.  Testosterone injections every 4 weeks starting 2020.      Diagnosis/Treatment/HPI:   1.  Patient was diagnosed with prostate cancer and underwent a prostatectomy in , pathology showed a Gwen score of 3+3 equal 6.  There was no extracapsular extension, no SVI, and surgical margins were negative.  9 pelvic lymph nodes were removed and all were negative.  His postoperative PSA was undetectable.  2.  In , the patient's PSA increased from 0.2-1.27.  He was started on Lupron therapy in 2009, Casodex added in 2010.  He continued to have a rise in his PSA and he underwent imaging, and a CT of the abdomen and pelvis on 2012 showed a 2 x 3 cm area of recurrent disease within the postoperative bed.  The patient underwent 39 fractions of radiation from 2012 to 2012 with Dr. Azael Rodgers.  3.  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  He was treated with EBRT to the mediastinum from 3/6/2018 to 3/19/2018.  4.  He was then started Xtandi and prednisone in 2018.  5.  He then underwent a CT scan of the chest, abdomen, and pelvis on 2019 that showed interval enlargement of multiple mediastinal nodes outside of his previous radiation fields, was treated again with  a EBRT to the mediastinum on 6/12/2019 to 6/26/2019.  6.  The patient's PSA continued to go up and was 54 in December 2019, therefore underwent a CT of the chest, abdomen, and pelvis on 1/9/2020 that showed interval enlargement of mediastinal lymphadenopathy, however this was compared to a PET/CT from 1/16/2018 and not his most recent CT from May 2019.  He also had a bone scan on 1/9/2020 that showed increased area of uptake in the left acetabulum, left superior pubic ramus, and pubic symphysis.  7.  CT of the chest, abdomen, and pelvis on 5/21/2020 showed enlarging mediastinal adenopathy especially in the high paratracheal region of the right.  There was a new metastatic lesion within the liver straddling segment IV/VIII.  There was more conspicuous sclerosis of the left pubic ramus and acetabulum as well as new area of sclerosis involving the T6 vertebral body.  Findings are suggestive of metastatic disease.    8.  Seen by Dr. Cole on 5/22/2020, due to liver mets patient not a candidate for radium 223 or radiation.    9.  Bone scan on 5/26/2020 showed new metastatic lesions involving T6 vertebral body in the right acetabulum.  Also there was progressed metastatic involvement in the left acetabulum and the left pubic rami.  PSA on 6/4/2020 was 332.86.   10.  Testosterone injections, 400 mg IM given every 4 weeks with the first dose on 7/7/2020.           Chief Complaint    Follow-up visit       8/31/2020 12:51 CDT      patient stated no new problems for today    8/31/2020 12:49 CDT      patient stated no new problems for today        Interval History   Patient here for follow-up visit with his sister present.  Overall he is doing well.  He has tolerated his testosterone without issue.  Unfortunately, his wife, Sandy suffered a fall and required surgery followed by rehab stent.  She is been in rehab for 2 weeks and comes home tomorrow.  He inquires about having his testosterone injection today so he can be home  with her when she gets back from rehab.  He states that he is no longer requiring many pain medications, in fact when he has pain, Aleve relieves the pain.    PMHx:  1.  Hypertension  2.  Atrial fibrillation status post pacemaker     PSHx:   1.  Prostatectomy  2.  Left knee replacement    Social Hx:   The patient is , was a previous smoker of half a pack a day for 20 years but quit in 1980.  He denies alcohol or illicit drug use.    Family Hx:   He has a family history of hypertension and arthritis.         Review of Systems   14 point review of systems done in full with pertinent positives as described above  Remainder of review systems done in full and unremarkable.      Health Status   Allergies:    Allergic Reactions (All)  No Known Allergies,    Allergies (1) Active Reaction  No Known Allergies None Documented     Current medications:  (Selected)   Outpatient Medications  Ordered  Versed: 1 mg, form: Injection, IV Push, Once, first dose 02/14/18 11:00:00 CST, stop date 02/14/18 11:00:00 CST, 1 to 2 mg initial then 1 mg every 2 min until sedation level 2 achieved  Versed: 2 mg, form: Injection, IV Slow, Once, first dose 02/14/18 9:00:00 CST, stop date 02/14/18 9:00:00 CST, give 1mg IVP repeat in 5min if still anxious for a total of 2mg.  Future  Testosterone Cypionate 200 mg/mL intramuscular solution: 400 mg, form: Soln, IM, Once-chemo, *Est. first dose 10/27/20 14:27:00 CDT, *Est. stop date 10/27/20 14:27:00 CDT, Routine, Weeks 13, Future Order  Testosterone Cypionate 200 mg/mL intramuscular solution: 400 mg, form: Soln, IM, Once-chemo, *Est. first dose 11/24/20 14:27:00 CST, *Est. stop date 11/24/20 14:27:00 CST, Routine, Weeks 17, Future Order  Testosterone Cypionate 200 mg/mL intramuscular solution: 400 mg, form: Soln, IM, Once-chemo, *Est. first dose 12/22/20 14:27:00 CST, *Est. stop date 12/22/20 14:27:00 CST, Routine, Weeks 21, Future Order  Testosterone Cypionate 200 mg/mL intramuscular solution:  400 mg, form: Soln, IM, Once-chemo, first dose 08/31/20 14:30:00 CDT, stop date 08/31/20 14:30:00 CDT, Routine, Weeks 5, Future Order  Testosterone Cypionate 200 mg/mL intramuscular solution: 400 mg, form: Soln, IM, Once-chemo, first dose 09/29/20 9:00:00 CDT, stop date 09/29/20 9:00:00 CDT, Routine, Weeks 9, Future Order  Prescriptions  Prescribed  Percocet 5/325 oral tablet: 1 tab(s), Oral, q6hr, PRN PRN for pain, quantity medically necessary, # 120 tab(s), 0 Refill(s), Pharmacy: [facility]/pharmacy #[id number], 184, cm, Height/Length Dosing, 06/19/20 9:50:00 CDT, 89.8, kg, Weight Dosing, 06/19/20 9:50:00 CDT  Documented Medications  Documented  Centrum Silver Ultra Men's: 1 tab(s), Oral, Daily, 0 Refill(s)  Citracal Maximum + D oral tablet: 1 tab(s), Oral, Daily, # 60 tab(s), 0 Refill(s)  Pradaxa 75 mg oral capsule: 75 mg = 1 cap(s), Oral, BID, 0 Refill(s)  Probiotic Formula (Bacillus Coagulans): tab 1, Oral, Daily, 0 Refill(s)  Vitamin D3 5000 intl units oral capsule: = 1 tab(s), Oral, Daily, 0 Refill(s)  Vitamin D3: 5,000 units = 5 tab(s), Oral, Daily, 0 Refill(s)  alendronate 70 mg oral tablet: 70 mg = 1 tab(s), Oral, qWeek  pravastatin 40 mg oral tablet: 40 mg = 1 tab(s), Oral, qAM, # 90 tab(s), 0 Refill(s)  prednisONE 10 mg oral tablet: 10 mg = 1 tab(s), Oral, Daily  sotalol 80 mg oral tablet: 1/2 tab, Oral, BID, 0 Refill(s)   Problem list:    All Problems  Cancer of prostate / SNOMED CT 7185946589 / Confirmed  Degenerative arthritis of right wrist / SNOMED CT 1597955517 / Confirmed  Ex-smoker / SNOMED CT 29888051 / Confirmed  radiation therapy 1 yr ago / SNOMED CT 321071374 / Confirmed  Hypercholesterolemia / SNOMED CT 98599238 / Confirmed  Hypertension / SNOMED CT 43641722 / Confirmed  Obesity / SNOMED CT 0237878705 / Probable  Resolved: Alteration in patterns of urinary elimination / SNOMED CT 93318002  Problem automatically updated based on documentation on Genitourinary Symptoms and/or Urinary  Elimination.  Resolved: At risk for falls / SNOMED CT 777814924  Problem automatically updated based on documentation on History of Falls, History of Falls in last 3 months Stearns, Stearns Fall Risk Score and/or ADLs.  Resolved: At risk for impaired skin integrity / SNOMED CT 002992998  Problem automatically updated based on documentation on Skin Abnormality Type, Pressure Ulcer Present on Admission, Oxygen Therapy, Mask/Delivery Type, Pressure Point, and/or a Demian Score of less than 16.  Resolved: At risk for infection / SNOMED CT 731655608  Problem automatically updated based on documentation on Surgical Drain Tube Type and/or Skin Abnormality Type.  Resolved: At risk of pressure sore / SNOMED CT 665955640  Resolved: At risk of UTI / SNOMED CT 811599908  Problem automatically updated based on documentation on Urinary Elimination.  Resolved: A-Fib / SNOMED CT 40818063  newly diagnosed- appt scheduled to see cardiologist( per [facility])  Resolved: Bowel dysfunction / SNOMED CT 396856244  Problem added automatically by system based on documentation of Bowel Sounds as Hyperactive, Hyperactive, or Absent;  GI Symptoms as Hyperactive, Constipation, or Diarrhea and/or Passing Flatus as No.  Resolved: Deficient knowledge / SNOMED CT 3483460769  Problem automatically updated based on documentation on Barriers to Learning, Level of Consciousness, Orientation Assessment, or Sensory Deficits.  Resolved: Impaired mobility / SNOMED CT 562472475  Problem automatically updated based on documentation on Assistive Device, ADLs, Activity Status ADLs, Musculoskeletal Range of Motion, Musculoskeletal Activity, Special Orthopedic Devices, and/or Traction Type.  Resolved: Impaired skin integrity / SNOMED CT 01323459  Problem automatically updated based on documentation on Skin Abnormality Type and/or Pressure Ulcer Present on Admisison.  Resolved: Ineffective airway clearance / SNOMED CT 512350100  Problem automatically updated based  on documentation of Shortness of Breath on Respiratory Symptoms; SpO2 less than 92%; Unable to clear secretions or Weak on Cough.,    Active Problems (7)  Cancer of prostate   Degenerative arthritis of right wrist   Ex-smoker   Hypercholesterolemia   Hypertension   Obesity   radiation therapy 1 yr ago         Physical Examination   Vital Signs   8/31/2020 12:51 CDT      Temperature Oral          36.3 DegC                             Temperature Oral (calculated)             97.34 DegF                             Peripheral Pulse Rate     65 bpm                             Respiratory Rate          20 br/min                             SpO2                      96 %                             Oxygen Therapy            Room air                             Systolic Blood Pressure   103 mmHg                             Diastolic Blood Pressure  82 mmHg                             Blood Pressure Location   Right arm                             Manual Cuff BP            No                             O2 SAT at rest            96 %     Measurements from flowsheet : Measurements   8/31/2020 12:51 CDT      Weight Dosing             96.200 kg                             Weight Measured           96.2 kg                             Weight Measured and Calculated in Lbs     212.08 lb                             Weight Estimated          96.2 kg                             Height/Length Dosing      184.00 cm                             Height/Length Measured    184 cm                             Height/Length Estimated   184 cm                             BSA Measured              2.22 m2                             BSA Estimated             2.22 m2                             Body Mass Index Estimated 28.41 kg/m2                             Body Mass Index Measured  28.41 kg/m2        Vital Signs (last 24 hrs)_____  Last Charted___________  Temp Oral     36.3 DegC  (AUG 31 12:51)  Heart Rate Peripheral   65 bpm  (AUG 31  12:51)  Resp Rate         20 br/min  (AUG 31 12:51)  SBP      103 mmHg  (AUG 31 12:51)  DBP      82 mmHg  (AUG 31 12:51)  SpO2      96 %  (AUG 31 12:51)  Weight      96.2 kg  (AUG 31 12:51)  Height      184 cm  (AUG 31 12:51)  BMI      28.41  (AUG 31 12:51)     General:  Alert and oriented, No acute distress.    Eye:  Extraocular movements are intact, Normal conjunctiva.    HENT:  Normocephalic, Oral mucosa is moist.    Neck:  Supple, No lymphadenopathy.    Respiratory:  Lungs are clear to auscultation, Respirations are non-labored, Breath sounds are equal.    Cardiovascular:  Normal rate, Regular rhythm, No edema.    Gastrointestinal:  Soft, Non-tender, Non-distended, Normal bowel sounds.    Integumentary:  Warm, Dry, Intact.    Neurologic:  Alert, Oriented, Normal sensory, No focal deficits, Walks with a cane.    Psychiatric:  Cooperative, Appropriate mood & affect.    Lymphatics:  No lymphadenopathy neck, axilla, groin.    Cognition and Speech:  Oriented, Speech clear and coherent.    ECOG Performance Scale: 2 - Capable of all self-care but unable to carry out any work activities. Up and about greater than 50 percent of waking hours.      Review / Management   Results review      Impression and Plan   Diagnoses:  1.  Metastatic prostate cancer with metastatic disease to the bones, liver, and lymph nodes.    Plan:  We had previously discussed at length the possibility of treating with chemotherapy, however due to the fact that the patient is 86 years old and does have some baseline weakness, do not feel that chemotherapy would be the best option for him.  He did undergo patient education, however while at that visit, I discussed starting testosterone injections, 400 mg IM given every 4 weeks.  The goal of this is to control his symptoms and decrease his PSA.    His PSA has started to decrease.  We will have him return to clinic in 4 weeks prior to his fourth dose of testosterone.  We will determine whether or  not to scan based on his PSA.    Patient is aware that if/when the testosterone stopped working, I would not proceed with chemotherapy.    CBC ,CMP, PSA prior to appt    Ken León II, MD

## 2022-04-30 NOTE — PROGRESS NOTES
Patient:   Aldo Pettit             MRN: [id number]            FIN: [id number]               Age:   87 years     Sex:  Male     :  11/15/1933   Associated Diagnoses:   None   Author:   Camryn CHACON, Dominique ALVES        Referring Physician:  Azael Rodgers MD      Visit Information     Problem List:   1.  Metastatic castrate resistant prostate cancer.    Treatment history:  1.  Previously taking Xtandi and prednisone.     Current Treatment:   1.  Lupron every 6 months.    2.  Testosterone injections every 4 weeks starting 2020.      Diagnosis/Treatment/HPI:   1.  Patient was diagnosed with prostate cancer and underwent a prostatectomy in , pathology showed a Gwen score of 3+3 equal 6.  There was no extracapsular extension, no SVI, and surgical margins were negative.  9 pelvic lymph nodes were removed and all were negative.  His postoperative PSA was undetectable.  2.  In , the patient's PSA increased from 0.2-1.27.  He was started on Lupron therapy in 2009, Casodex added in 2010.  He continued to have a rise in his PSA and he underwent imaging, and a CT of the abdomen and pelvis on 2012 showed a 2 x 3 cm area of recurrent disease within the postoperative bed.  The patient underwent 39 fractions of radiation from 2012 to 2012 with Dr. Azael Rodgers.  3.  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  He was treated with EBRT to the mediastinum from 3/6/2018 to 3/19/2018.  4.  He was then started Xtandi and prednisone in 2018.  5.  He then underwent a CT scan of the chest, abdomen, and pelvis on 2019 that showed interval enlargement of multiple mediastinal nodes outside of his previous radiation fields, was treated again with a EBRT to the mediastinum on  "6/12/2019 to 6/26/2019.  6.  The patient's PSA continued to go up and was 54 in December 2019, therefore underwent a CT of the chest, abdomen, and pelvis on 1/9/2020 that showed interval enlargement of mediastinal lymphadenopathy, however this was compared to a PET/CT from 1/16/2018 and not his most recent CT from May 2019.  He also had a bone scan on 1/9/2020 that showed increased area of uptake in the left acetabulum, left superior pubic ramus, and pubic symphysis.  7.  CT of the chest, abdomen, and pelvis on 5/21/2020 showed enlarging mediastinal adenopathy especially in the high paratracheal region of the right.  There was a new metastatic lesion within the liver straddling segment IV/VIII.  There was more conspicuous sclerosis of the left pubic ramus and acetabulum as well as new area of sclerosis involving the T6 vertebral body.  Findings are suggestive of metastatic disease.    8.  Seen by Dr. Cole on 5/22/2020, due to liver mets patient not a candidate for radium 223 or radiation.    9.  Bone scan on 5/26/2020 showed new metastatic lesions involving T6 vertebral body in the right acetabulum.  Also there was progressed metastatic involvement in the left acetabulum and the left pubic rami.  PSA on 6/4/2020 was 332.86.   10.  Testosterone injections, 400 mg IM given every 4 weeks with the first dose on 7/7/2020.      Chief Complaint   Follow up      Interval History   Patient here for follow-up visit for his metastatic prostate cancer. He is feeling good overall w/o any new complaints. Denies HA, SOB, CP, N/V/C/D. He reports he did not take his Kayexalate we called in for him at his last visit for his hyperkalemia because he was "afraid of the side effects." I discussed with him the potential dangers of hyperkalemia if remained untreated and he voiced understanding. He states that he was previously using "potassium salt" and that he has since discontinued this and is hopefully that this will reflect in his " labs today. CMP is still pending at this time. CBC stable overall. Repeat PSA today pending, however if reviewing previous PSA there has been a steady increase over the last several months.   Patient denies new bone pain. Denies new neurological symptoms. Denies recent fevers or infections. Appetite and energy are stable overall. No other questions noted.       Review of Systems   14 point review of systems done in full with pertinent positives as described above  Remainder of review systems done in full and unremarkable.      Health Status   Allergies:    Allergic Reactions (Selected)  No Known Allergies,    Allergies (1) Active Reaction  No Known Allergies None Documented     Current medications:  (Selected)   Outpatient Medications  Ordered  Versed: 1 mg, form: Injection, IV Push, Once, first dose 02/14/18 11:00:00 CST, stop date 02/14/18 11:00:00 CST, 1 to 2 mg initial then 1 mg every 2 min until sedation level 2 achieved  Versed: 2 mg, form: Injection, IV Slow, Once, first dose 02/14/18 9:00:00 CST, stop date 02/14/18 9:00:00 CST, give 1mg IVP repeat in 5min if still anxious for a total of 2mg.  Future  Testosterone Cypionate 200 mg/mL intramuscular solution: 400 mg, form: Soln, IM, Once-chemo, first dose 03/24/21 14:40:00 CDT, stop date 03/24/21 14:40:00 CDT, Routine, Weeks 9, Future Order  Testosterone Cypionate 200 mg/mL intramuscular solution: 400 mg, form: Soln, IM, Once-chemo, first dose 04/21/21 13:20:00 CDT, stop date 04/21/21 13:20:00 CDT, Routine, Weeks 13, Future Order  Testosterone Cypionate 200 mg/mL intramuscular solution: 400 mg, form: Soln, IM, Once-chemo, first dose 05/19/21 13:40:00 CDT, stop date 05/19/21 13:40:00 CDT, Routine, Weeks 17, Future Order  Testosterone Cypionate 200 mg/mL intramuscular solution: 400 mg, form: Soln, IM, Once-chemo, first dose 06/16/21 13:20:00 CDT, stop date 06/16/21 13:20:00 CDT, Routine, Weeks 21, Future Order  Documented Medications  Documented  Aleve 220 mg  oral tablet: 440 mg = 2 tab(s), Oral, q8hr, PRN PRN for pain, # 60 tab(s), 0 Refill(s)  Centrum Silver Ultra Men's: 1 tab(s), Oral, Daily, 0 Refill(s)  Citracal Maximum + D oral tablet: 1 tab(s), Oral, Daily, # 60 tab(s), 0 Refill(s)  Pradaxa 75 mg oral capsule: 75 mg = 1 cap(s), Oral, BID, 0 Refill(s)  Probiotic Formula (Bacillus Coagulans): tab 1, Oral, Daily, 0 Refill(s)  Vitamin D3: 5,000 units = 5 tab(s), Oral, Daily, 0 Refill(s)  alendronate 70 mg oral tablet: 70 mg = 1 tab(s), Oral, qWeek  cefdinir 300 mg oral capsule: 300 mg = 1 cap(s), Oral, q12hr  furosemide 20 mg oral tablet: 20 mg = 1 tab(s), Oral, Daily  furosemide 40 mg oral tablet: 40 mg = 1 tab(s), Oral, Daily  pravastatin 40 mg oral tablet: 40 mg = 1 tab(s), Oral, qAM, # 90 tab(s), 0 Refill(s)  prednisONE 10 mg oral tablet: 10 mg = 1 tab(s), Oral, Daily  sotalol 80 mg oral tablet: 1/2 tab, Oral, BID, 0 Refill(s)   Problem list:    All Problems  Cancer of prostate / SNOMED CT 5412466740 / Confirmed  Degenerative arthritis of right wrist / SNOMED CT 5613301741 / Confirmed  Ex-smoker / SNOMED CT 94049085 / Confirmed  radiation therapy 1 yr ago / SNOMED CT 957137245 / Confirmed  Hypercholesterolemia / SNOMED CT 33141950 / Confirmed  Hypertension / SNOMED CT 11533651 / Confirmed  Obesity / SNOMED CT 5095929164 / Probable  Secondary malignant neoplasm of bone / SNOMED CT 162161755 / Confirmed  Secondary malignant neoplasm of liver / SNOMED CT 855240654 / Confirmed  Secondary malignant neoplasm of mediastinal lymph nodes / SNOMED CT 878226021 / Confirmed  Resolved: Alteration in patterns of urinary elimination / SNOMED CT 05070328  Problem automatically updated based on documentation on Genitourinary Symptoms and/or Urinary Elimination.  Resolved: At risk for falls / SNOMED CT 155083810  Problem automatically updated based on documentation on History of Falls, History of Falls in last 3 months [place], Jinny Fall Risk Score and/or ADLs.  Resolved: At risk  for impaired skin integrity / SNOMED CT 998013391  Problem automatically updated based on documentation on Skin Abnormality Type, Pressure Ulcer Present on Admission, Oxygen Therapy, Mask/Delivery Type, Pressure Point, and/or a Demian Score of less than 16.  Resolved: At risk for infection / SNOMED CT 440453205  Problem automatically updated based on documentation on Surgical Drain Tube Type and/or Skin Abnormality Type.  Resolved: At risk of pressure sore / SNOMED CT 393968699  Resolved: At risk of UTI / SNOMED CT 161703005  Problem automatically updated based on documentation on Urinary Elimination.  Resolved: A-Fib / SNOMED CT 45865768  newly diagnosed- appt scheduled to see cardiologist( per [facility])  Resolved: Bowel dysfunction / SNOMED CT 731626297  Problem added automatically by system based on documentation of Bowel Sounds as Hyperactive, Hyperactive, or Absent;  GI Symptoms as Hyperactive, Constipation, or Diarrhea and/or Passing Flatus as No.  Resolved: Deficient knowledge / SNOMED CT 7049728162  Problem automatically updated based on documentation on Barriers to Learning, Level of Consciousness, Orientation Assessment, or Sensory Deficits.  Resolved: Impaired mobility / SNOMED CT 633712883  Problem automatically updated based on documentation on Assistive Device, ADLs, Activity Status ADLs, Musculoskeletal Range of Motion, Musculoskeletal Activity, Special Orthopedic Devices, and/or Traction Type.  Resolved: Impaired skin integrity / SNOMED CT 48097586  Problem automatically updated based on documentation on Skin Abnormality Type and/or Pressure Ulcer Present on Admisison.  Resolved: Ineffective airway clearance / SNOMED CT 167963090  Problem automatically updated based on documentation of Shortness of Breath on Respiratory Symptoms; SpO2 less than 92%; Unable to clear secretions or Weak on Cough.,    Active Problems (10)  Cancer of prostate   Degenerative arthritis of right wrist   Ex-smoker    Hypercholesterolemia   Hypertension   Obesity   radiation therapy 1 yr ago   Secondary malignant neoplasm of bone   Secondary malignant neoplasm of liver   Secondary malignant neoplasm of mediastinal lymph nodes         Physical Examination   Vital Signs   3/24/2021 14:10 CDT      Temperature Oral          37.1 DegC                             Temperature Oral (calculated)             98.78 DegF                             Peripheral Pulse Rate     62 bpm                             Respiratory Rate          18 br/min                             SpO2                      96 %                             Oxygen Therapy            Room air                             Systolic Blood Pressure   121 mmHg                             Diastolic Blood Pressure  81 mmHg                             Blood Pressure Location   Right arm                             Manual Cuff BP            No                             O2 SAT at rest            96 %     Measurements from flowsheet : Measurements   3/24/2021 14:10 CDT      Weight Dosing             93.300 kg                             Weight Measured           93.3 kg                             Weight Measured and Calculated in Lbs     205.69 lb                             Height/Length Dosing      184.00 cm                             Height/Length Measured    184 cm                             BSA Measured              2.18 m2                             Body Mass Index Measured  27.56 kg/m2             General:  Alert and oriented, No acute distress, Pleasant, elderly male.         Ambulation status: Assistive devices ( Cane ).    Eye:  Pupils are equal, round and reactive to light, Extraocular movements are intact, Normal conjunctiva, Vision unchanged.    HENT:  Normocephalic, Normal hearing, Oral mucosa is moist.    Neck:  Supple, Non-tender, No lymphadenopathy.    Respiratory:  Lungs are clear to auscultation, Respirations are non-labored, Breath sounds are equal.     Cardiovascular:  Normal rate, Regular rhythm, bilateral lower extremity edema- 2+ ; compression wrap stockings in place.    Gastrointestinal:  Soft, Non-tender, Non-distended, Normal bowel sounds.    Integumentary:  Warm, Dry, Pink, Intact.    Neurologic:  Alert, Oriented, Normal sensory, No focal deficits, Walks with a cane.    Psychiatric:  Cooperative, Appropriate mood & affect.    Genitourinary:  incontinent, wears depend undergarmet.    Lymphatics:  No lymphadenopathy neck, axilla, groin.    Musculoskeletal:  No deformity,      Mobility/ gait: Able to walk with minimal assistance, Able to walk with a cane, Geriatric stiffness, compression stockings. Using compression device.    Cognition and Speech:  Oriented, Speech clear and coherent.    ECOG Performance Scale: 2 - Capable of all self-care but unable to carry out any work activities. Up and about greater than 50 percent of waking hours.      Review / Management   Results review:  Last 10 Days Lab Results : Lab View   3/24/2021 13:33 CDT      WBC                       7.9 x10(3)/mcL                             RBC                       4.94 x10(6)/mcL                             Hgb                       15.7 gm/dL                             Hct                       50.0 %                             Platelet                  172 x10(3)/mcL                             MCV                       101.2 fL  HI                             MCH                       31.8 pg  HI                             MCHC                      31.4 gm/dL  LOW                             RDW                       14.0 %                             MPV                       10.7 fL                             Abs Neut                  6.49 x10(3)/mcL                             NEUT%                     81.7 %  NA                             NEUT#                     6.5 x10(3)/mcL                             LYMPH%                    6.2 %  NA                              LYMPH#                    0.5 x10(3)/mcL  LOW                             MONO%                     7.3 %  NA                             MONO#                     0.6 x10(3)/mcL                             EOS%                      2.4 %  NA                             EOS#                      0.2 x10(3)/mcL                             BASO%                     0.3 %  NA                             BASO#                     0.0 x10(3)/mcL  .       Impression and Plan   Diagnoses:  1.  Metastatic prostate cancer with metastatic disease to the bones, liver, and lymph nodes.    Plan:  We had previously discussed at length the possibility of treating with chemotherapy, however due to the fact that the patient is 87 years old and does have some baseline weakness, do not feel that chemotherapy would be the best option for him.  He did undergo patient education, however while at that visit, I discussed starting testosterone injections, 400 mg IM given every 4 weeks.  The goal of this is to control his symptoms and decrease his PSA.    PSA continues to rise, discussed with Dr León -who agrees with repeat imaging     CT C/A/P and NM bone scan ordered     - Continue testosterone q 4 weeks- Ok to proceed today as planned    Patient is aware that if/when the testosterone stopped working, we would not proceed with chemotherapy.    Continue scheduled follow-up appointments with physicians - Cardiology and primary care.     RTC in 4 weeks for OV and clinical examination.     - CBC ,CMP, PSA prior to appt    Call with questions/concerns should they arise      GLEN DeyC

## 2022-04-30 NOTE — PROGRESS NOTES
Patient:   Aldo Pettit             MRN: [id number]            FIN: [id number]               Age:   87 years     Sex:  Male     :  11/15/1933   Associated Diagnoses:   None   Author:   Camryn CHACON, Dominique ALVES        Referring Physician:  Azael Rodgers MD      Visit Information     Problem List:   1.  Metastatic castrate resistant prostate cancer.    Treatment history:  1.  Previously taking Xtandi and prednisone.     Current Treatment:   1.  Lupron every 6 months.    2.  Testosterone injections every 4 weeks starting 2020.      Diagnosis/Treatment/HPI:   1.  Patient was diagnosed with prostate cancer and underwent a prostatectomy in , pathology showed a Gwen score of 3+3 equal 6.  There was no extracapsular extension, no SVI, and surgical margins were negative.  9 pelvic lymph nodes were removed and all were negative.  His postoperative PSA was undetectable.  2.  In , the patient's PSA increased from 0.2-1.27.  He was started on Lupron therapy in 2009, Casodex added in 2010.  He continued to have a rise in his PSA and he underwent imaging, and a CT of the abdomen and pelvis on 2012 showed a 2 x 3 cm area of recurrent disease within the postoperative bed.  The patient underwent 39 fractions of radiation from 2012 to 2012 with Dr. Azael Rodgers.  3.  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  He was treated with EBRT to the mediastinum from 3/6/2018 to 3/19/2018.  4.  He was then started Xtandi and prednisone in 2018.  5.  He then underwent a CT scan of the chest, abdomen, and pelvis on 2019 that showed interval enlargement of multiple mediastinal nodes outside of his previous radiation fields, was treated again with a EBRT to the mediastinum on  6/12/2019 to 6/26/2019.  6.  The patient's PSA continued to go up and was 54 in December 2019, therefore underwent a CT of the chest, abdomen, and pelvis on 1/9/2020 that showed interval enlargement of mediastinal lymphadenopathy, however this was compared to a PET/CT from 1/16/2018 and not his most recent CT from May 2019.  He also had a bone scan on 1/9/2020 that showed increased area of uptake in the left acetabulum, left superior pubic ramus, and pubic symphysis.  7.  CT of the chest, abdomen, and pelvis on 5/21/2020 showed enlarging mediastinal adenopathy especially in the high paratracheal region of the right.  There was a new metastatic lesion within the liver straddling segment IV/VIII.  There was more conspicuous sclerosis of the left pubic ramus and acetabulum as well as new area of sclerosis involving the T6 vertebral body.  Findings are suggestive of metastatic disease.    8.  Seen by Dr. Cole on 5/22/2020, due to liver mets patient not a candidate for radium 223 or radiation.    9.  Bone scan on 5/26/2020 showed new metastatic lesions involving T6 vertebral body in the right acetabulum.  Also there was progressed metastatic involvement in the left acetabulum and the left pubic rami.  PSA on 6/4/2020 was 332.86.   10.  Testosterone injections, 400 mg IM given every 4 weeks with the first dose on 7/7/2020.      Chief Complaint   Follow up      Interval History   Patient here for follow-up visit for his metastatic prostate cancer with sister present. He is doing well overall w/o any new complaints. Continuing to tolerate testosterone injections w/o issue. Denies HA, SOB, CP, N/V/C/D. Bilateral LE swelling is stable. Denies fevers or chills or other signs of infection. Appetite and energy are stable. VSS.   CBC/BMP reviewed- elevated potassium noted, however I'm unsure of accuracy of this level- will verify with CMP.       Review of Systems   14 point review of systems done in full with pertinent positives  as described above  Remainder of review systems done in full and unremarkable.      Health Status   Allergies:    Allergic Reactions (Selected)  No Known Allergies,    Allergies (1) Active Reaction  No Known Allergies None Documented     Current medications:  (Selected)   Outpatient Medications  Ordered  Versed: 1 mg, form: Injection, IV Push, Once, first dose 02/14/18 11:00:00 CST, stop date 02/14/18 11:00:00 CST, 1 to 2 mg initial then 1 mg every 2 min until sedation level 2 achieved  Versed: 2 mg, form: Injection, IV Slow, Once, first dose 02/14/18 9:00:00 CST, stop date 02/14/18 9:00:00 CST, give 1mg IVP repeat in 5min if still anxious for a total of 2mg.  Future  Testosterone Cypionate 200 mg/mL intramuscular solution: 400 mg, form: Soln, IM, Once-chemo, first dose 02/24/21 11:30:00 CST, stop date 02/24/21 11:30:00 CST, Routine, Weeks 5, Future Order  Testosterone Cypionate 200 mg/mL intramuscular solution: 400 mg, form: Soln, IM, Once-chemo, first dose 03/24/21 9:20:00 CDT, stop date 03/24/21 9:20:00 CDT, Routine, Weeks 9, Future Order  Testosterone Cypionate 200 mg/mL intramuscular solution: 400 mg, form: Soln, IM, Once-chemo, first dose 04/21/21 9:40:00 CDT, stop date 04/21/21 9:40:00 CDT, Routine, Weeks 13, Future Order  Testosterone Cypionate 200 mg/mL intramuscular solution: 400 mg, form: Soln, IM, Once-chemo, first dose 05/19/21 10:00:00 CDT, stop date 05/19/21 10:00:00 CDT, Routine, Weeks 17, Future Order  Testosterone Cypionate 200 mg/mL intramuscular solution: 400 mg, form: Soln, IM, Once-chemo, first dose 06/16/21 10:00:00 CDT, stop date 06/16/21 10:00:00 CDT, Routine, Weeks 21, Future Order  Documented Medications  Documented  Aleve 220 mg oral tablet: 440 mg = 2 tab(s), Oral, q8hr, PRN PRN for pain, # 60 tab(s), 0 Refill(s)  Centrum Silver Ultra Men's: 1 tab(s), Oral, Daily, 0 Refill(s)  Citracal Maximum + D oral tablet: 1 tab(s), Oral, Daily, # 60 tab(s), 0 Refill(s)  Pradaxa 75 mg oral capsule:  75 mg = 1 cap(s), Oral, BID, 0 Refill(s)  Probiotic Formula (Bacillus Coagulans): tab 1, Oral, Daily, 0 Refill(s)  Vitamin D3: 5,000 units = 5 tab(s), Oral, Daily, 0 Refill(s)  alendronate 70 mg oral tablet: 70 mg = 1 tab(s), Oral, qWeek  cefdinir 300 mg oral capsule: 300 mg = 1 cap(s), Oral, q12hr  furosemide 20 mg oral tablet: 20 mg = 1 tab(s), Oral, Daily  furosemide 40 mg oral tablet: 40 mg = 1 tab(s), Oral, Daily  pravastatin 40 mg oral tablet: 40 mg = 1 tab(s), Oral, qAM, # 90 tab(s), 0 Refill(s)  prednisONE 10 mg oral tablet: 10 mg = 1 tab(s), Oral, Daily  sotalol 80 mg oral tablet: 1/2 tab, Oral, BID, 0 Refill(s)   Problem list:    All Problems  Cancer of prostate / SNOMED CT 8944988434 / Confirmed  Degenerative arthritis of right wrist / SNOMED CT 6545478205 / Confirmed  Ex-smoker / SNOMED CT 79102951 / Confirmed  radiation therapy 1 yr ago / SNOMED CT 208979749 / Confirmed  Hypercholesterolemia / SNOMED CT 31477220 / Confirmed  Hypertension / SNOMED CT 55642615 / Confirmed  Obesity / SNOMED CT 0406678839 / Probable  Secondary malignant neoplasm of bone / SNOMED CT 967075627 / Confirmed  Secondary malignant neoplasm of liver / SNOMED CT 355154660 / Confirmed  Secondary malignant neoplasm of mediastinal lymph nodes / SNOMED CT 242583100 / Confirmed  Resolved: Alteration in patterns of urinary elimination / SNOMED CT 62492288  Problem automatically updated based on documentation on Genitourinary Symptoms and/or Urinary Elimination.  Resolved: At risk for falls / SNOMED CT 092033154  Problem automatically updated based on documentation on History of Falls, History of Falls in last 3 months Stearns, Stearns Fall Risk Score and/or ADLs.  Resolved: At risk for impaired skin integrity / SNOMED CT 537615808  Problem automatically updated based on documentation on Skin Abnormality Type, Pressure Ulcer Present on Admission, Oxygen Therapy, Mask/Delivery Type, Pressure Point, and/or a Demian Score of less than  16.  Resolved: At risk for infection / SNOMED CT 057993892  Problem automatically updated based on documentation on Surgical Drain Tube Type and/or Skin Abnormality Type.  Resolved: At risk of pressure sore / SNOMED CT 910761138  Resolved: At risk of UTI / SNOMED CT 970153407  Problem automatically updated based on documentation on Urinary Elimination.  Resolved: A-Fib / SNOMED CT 58467644  newly diagnosed- appt scheduled to see cardiologist( per [facility])  Resolved: Bowel dysfunction / SNOMED CT 017867514  Problem added automatically by system based on documentation of Bowel Sounds as Hyperactive, Hyperactive, or Absent;  GI Symptoms as Hyperactive, Constipation, or Diarrhea and/or Passing Flatus as No.  Resolved: Deficient knowledge / SNOMED CT 8867157912  Problem automatically updated based on documentation on Barriers to Learning, Level of Consciousness, Orientation Assessment, or Sensory Deficits.  Resolved: Impaired mobility / SNOMED CT 501580561  Problem automatically updated based on documentation on Assistive Device, ADLs, Activity Status ADLs, Musculoskeletal Range of Motion, Musculoskeletal Activity, Special Orthopedic Devices, and/or Traction Type.  Resolved: Impaired skin integrity / SNOMED CT 71319256  Problem automatically updated based on documentation on Skin Abnormality Type and/or Pressure Ulcer Present on Admisison.  Resolved: Ineffective airway clearance / SNOMED CT 003888341  Problem automatically updated based on documentation of Shortness of Breath on Respiratory Symptoms; SpO2 less than 92%; Unable to clear secretions or Weak on Cough.,    Active Problems (10)  Cancer of prostate   Degenerative arthritis of right wrist   Ex-smoker   Hypercholesterolemia   Hypertension   Obesity   radiation therapy 1 yr ago   Secondary malignant neoplasm of bone   Secondary malignant neoplasm of liver   Secondary malignant neoplasm of mediastinal lymph nodes         Physical Examination   Vital Signs    2/24/2021 10:54 CST      Temperature Oral          36.2 DegC                             Temperature Oral (calculated)             97.16 DegF                             Peripheral Pulse Rate     60 bpm                             Respiratory Rate          20 br/min                             SpO2                      96 %                             Oxygen Therapy            Room air                             Systolic Blood Pressure   121 mmHg                             Diastolic Blood Pressure  81 mmHg                             Blood Pressure Location   Left arm                             Manual Cuff BP            No                             O2 SAT at rest            96 %     Measurements from flowsheet : Measurements   2/24/2021 10:54 CST      Weight Dosing             91.900 kg                             Weight Measured           91.9 kg                             Weight Measured and Calculated in Lbs     202.60 lb                             Height/Length Dosing      184.00 cm                             Height/Length Measured    184 cm                             BSA Measured              2.17 m2                             Body Mass Index Measured  27.14 kg/m2             General:  Alert and oriented, No acute distress, Pleasant, elderly male.         Ambulation status: Assistive devices ( Cane ).    Eye:  Pupils are equal, round and reactive to light, Extraocular movements are intact, Normal conjunctiva, Vision unchanged.    HENT:  Normocephalic, Normal hearing, Oral mucosa is moist.    Neck:  Supple, Non-tender, No lymphadenopathy.    Respiratory:  Lungs are clear to auscultation, Respirations are non-labored, Breath sounds are equal.    Cardiovascular:  Normal rate, Regular rhythm, bilateral lower extremity edema- 2+ ; compression wrap stockings in place.    Gastrointestinal:  Soft, Non-tender, Non-distended, Normal bowel sounds.    Integumentary:  Warm, Dry, Pink, Intact, abrasion to right  forearm from fall.    Neurologic:  Alert, Oriented, Normal sensory, No focal deficits, Walks with a cane.    Psychiatric:  Cooperative, Appropriate mood & affect.    Genitourinary:  incontinent, wears depend undergarmet.    Lymphatics:  No lymphadenopathy neck, axilla, groin.    Musculoskeletal:  No deformity,      Mobility/ gait: Able to walk with minimal assistance, Able to walk with a cane, Geriatric stiffness, compression stockings. Using compression device.    Cognition and Speech:  Oriented, Speech clear and coherent.    ECOG Performance Scale: 2 - Capable of all self-care but unable to carry out any work activities. Up and about greater than 50 percent of waking hours.      Review / Management   Results review:  Lab results   2/24/2021 11:07 CST      Est Creat Clearance Ser   32.15 mL/min    2/24/2021 11:04 CST      POC TCO2                  31.0 mmol/L  HI                             POC Hb                    18.0 mg/dL  HI                             POC Hct                   53.0 %  HI                             POC Sodium                139 mmol/L                             POC Potassium             5.9 mmol/L  HI                             POC Chloride              103 mmol/L                             POC Ion Calcium           1.19 mmol/L                             POC Glucose               96 mg/dL                             POC BUN                   38.0 mg/dL  HI                             POC Creatinine            1.8 mg/dL  HI                             POC AGAP                  12.0  NA    2/24/2021 10:35 CST      WBC                       8.1 x10(3)/mcL                             RBC                       5.21 x10(6)/mcL                             Hgb                       16.3 gm/dL                             Hct                       52.6 %  HI                             Platelet                  214 x10(3)/mcL                             MCV                       101.0 fL  HI                              MCH                       31.3 pg  HI                             MCHC                      31.0 gm/dL  LOW                             RDW                       14.3 %                             MPV                       10.7 fL                             Abs Neut                  7.20 x10(3)/mcL                             NEUT%                     89.2 %  NA                             NEUT#                     7.2 x10(3)/mcL                             LYMPH%                    4.4 %  NA                             LYMPH#                    0.4 x10(3)/mcL  LOW                             MONO%                     3.3 %  NA                             MONO#                     0.3 x10(3)/mcL                             EOS%                      0.6 %  NA                             EOS#                      0.0 x10(3)/mcL                             BASO%                     0.2 %  NA                             BASO#                     0.0 x10(3)/mcL  .       Impression and Plan   Diagnoses:  1.  Metastatic prostate cancer with metastatic disease to the bones, liver, and lymph nodes.    Plan:  We had previously discussed at length the possibility of treating with chemotherapy, however due to the fact that the patient is 87 years old and does have some baseline weakness, do not feel that chemotherapy would be the best option for him.  He did undergo patient education, however while at that visit, I discussed starting testosterone injections, 400 mg IM given every 4 weeks.  The goal of this is to control his symptoms and decrease his PSA.    Verify hyperkalemia with repeat CMP-results pending    His PSA pending from today. Will only repeat scans if PSA begins to rise.   - Continue testosterone q 4 weeks- will proceed today as planned  Patient is aware that if/when the testosterone stopped working, I would not proceed with chemotherapy.    Continue scheduled follow-up appointments with  physicians - Cardiology and primary care.     RTC in 4 weeks for OV and clinical examination.   - CBC ,CMP, PSA prior to appt    Call with questions/concerns should they arise      Dominique FRAUSTO, JAMESP-C

## 2022-04-30 NOTE — PROGRESS NOTES
Oncology Office Visit      Patient:   Aldo Hernandez            MRN: [id number]            FIN: [id number]               Age:   87 years     Sex:  Male     :  11/15/1933   Associated Diagnoses:   None   Author:   Aniya Monte NP        Referring Physician:  Azael Rodgers MD      Visit Information     Problem List:   1.  Metastatic castrate resistant prostate cancer.    Treatment history:  1.  Previously taking Xtandi and prednisone.     Current Treatment:   1.  Lupron every 6 months.    2.  Testosterone injections every 4 weeks starting 2020.      Diagnosis/Treatment/HPI:   1.  Patient was diagnosed with prostate cancer and underwent a prostatectomy in , pathology showed a [place] score of 3+3 equal 6.  There was no extracapsular extension, no SVI, and surgical margins were negative.  9 pelvic lymph nodes were removed and all were negative.  His postoperative PSA was undetectable.  2.  In , the patient's PSA increased from 0.2-1.27.  He was started on Lupron therapy in 2009, Casodex added in 2010.  He continued to have a rise in his PSA and he underwent imaging, and a CT of the abdomen and pelvis on 2012 showed a 2 x 3 cm area of recurrent disease within the postoperative bed.  The patient underwent 39 fractions of radiation from 2012 to 2012 with Dr. Azael Rodgers.  3.  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  He was treated with EBRT to the mediastinum from 3/6/2018 to 3/19/2018.  4.  He was then started Xtandi and prednisone in 2018.  5.  He then underwent a CT scan of the chest, abdomen, and pelvis on 2019 that showed interval enlargement of multiple mediastinal nodes outside of his previous radiation fields, was treated again with a  EBRT to the mediastinum on 6/12/2019 to 6/26/2019.  6.  The patient's PSA continued to go up and was 54 in December 2019, therefore underwent a CT of the chest, abdomen, and pelvis on 1/9/2020 that showed interval enlargement of mediastinal lymphadenopathy, however this was compared to a PET/CT from 1/16/2018 and not his most recent CT from May 2019.  He also had a bone scan on 1/9/2020 that showed increased area of uptake in the left acetabulum, left superior pubic ramus, and pubic symphysis.  7.  CT of the chest, abdomen, and pelvis on 5/21/2020 showed enlarging mediastinal adenopathy especially in the high paratracheal region of the right.  There was a new metastatic lesion within the liver straddling segment IV/VIII.  There was more conspicuous sclerosis of the left pubic ramus and acetabulum as well as new area of sclerosis involving the T6 vertebral body.  Findings are suggestive of metastatic disease.    8.  Seen by Dr. Cole on 5/22/2020, due to liver mets patient not a candidate for radium 223 or radiation.    9.  Bone scan on 5/26/2020 showed new metastatic lesions involving T6 vertebral body in the right acetabulum.  Also there was progressed metastatic involvement in the left acetabulum and the left pubic rami.  PSA on 6/4/2020 was 332.86.   10.  Testosterone injections, 400 mg IM given every 4 weeks with the first dose on 7/7/2020.      Chief Complaint      Interval History   Patient here for follow-up visit for his metastatic prostate cancer. He is on Lupron as well as monthly testosterone injections. His PSA level has been stable, slight increase last visit, but still within his trend. Today's result is pending. He reports falling at urgent care a couple of weeks ago. He continues with chronic symptoms, not worsening. He has HH coming every other day to apply compression dressings to bilateral lower extremities which is helping. He has occasional pains to his hips and lower back and takes Norco daily.  No new symptoms.  Denies HA, SOB, CP, N/V/C/D.  Denies new neurological symptoms.       Review of Systems   14 point review of systems done in full with pertinent positives as described above  Remainder of review systems done in full and unremarkable.      Health Status   Allergies:    Allergic Reactions (Selected)  No Known Allergies,    Allergies (1) Active Reaction  No Known Allergies None Documented     Current medications:  (Selected)   Outpatient Medications  Ordered  Versed: 1 mg, form: Injection, IV Push, Once, first dose 02/14/18 11:00:00 CST, stop date 02/14/18 11:00:00 CST, 1 to 2 mg initial then 1 mg every 2 min until sedation level 2 achieved  Versed: 2 mg, form: Injection, IV Slow, Once, first dose 02/14/18 9:00:00 CST, stop date 02/14/18 9:00:00 CST, give 1mg IVP repeat in 5min if still anxious for a total of 2mg.  Future  Testosterone Cypionate 200 mg/mL intramuscular solution: 400 mg, form: Soln, IM, Once-chemo, first dose 01/19/21 9:40:00 CST, stop date 01/19/21 9:40:00 CST, Routine, Weeks 1, Future Order  Testosterone Cypionate 200 mg/mL intramuscular solution: 400 mg, form: Soln, IM, Once-chemo, first dose 02/17/21 9:20:00 CST, stop date 02/17/21 9:20:00 CST, Routine, Weeks 5, Future Order  Testosterone Cypionate 200 mg/mL intramuscular solution: 400 mg, form: Soln, IM, Once-chemo, first dose 03/16/21 9:40:00 CDT, stop date 03/16/21 9:40:00 CDT, Routine, Weeks 9, Future Order  Testosterone Cypionate 200 mg/mL intramuscular solution: 400 mg, form: Soln, IM, Once-chemo, first dose 04/13/21 9:40:00 CDT, stop date 04/13/21 9:40:00 CDT, Routine, Weeks 13, Future Order  Testosterone Cypionate 200 mg/mL intramuscular solution: 400 mg, form: Soln, IM, Once-chemo, first dose 05/11/21 9:40:00 CDT, stop date 05/11/21 9:40:00 CDT, Routine, Weeks 17, Future Order  Testosterone Cypionate 200 mg/mL intramuscular solution: 400 mg, form: Soln, IM, Once-chemo, first dose 06/08/21 9:40:00 CDT, stop date 06/08/21  9:40:00 CDT, Routine, Weeks 21, Future Order  Prescriptions  Prescribed  acetaminophen-hydrocodone 325 mg-5 mg oral tablet: 1 tab(s), Oral, q6hr, PRN PRN as needed for pain, # 90 tab(s), 0 Refill(s), Pharmacy: [facility]/pharmacy #[id number], 184, cm, Height/Length Dosing, 12/22/20 9:40:00 CST, 94.6, kg, Weight Dosing, 12/22/20 9:40:00 CST  Documented Medications  Documented  Aleve 220 mg oral tablet: 440 mg = 2 tab(s), Oral, q8hr, PRN PRN for pain, # 60 tab(s), 0 Refill(s)  Centrum Silver Ultra Men's: 1 tab(s), Oral, Daily, 0 Refill(s)  Citracal Maximum + D oral tablet: 1 tab(s), Oral, Daily, # 60 tab(s), 0 Refill(s)  Pradaxa 75 mg oral capsule: 75 mg = 1 cap(s), Oral, BID, 0 Refill(s)  Probiotic Formula (Bacillus Coagulans): tab 1, Oral, Daily, 0 Refill(s)  Vitamin D3: 5,000 units = 5 tab(s), Oral, Daily, 0 Refill(s)  alendronate 70 mg oral tablet: 70 mg = 1 tab(s), Oral, qWeek  cefdinir 300 mg oral capsule: 300 mg = 1 cap(s), Oral, q12hr  furosemide 20 mg oral tablet: 20 mg = 1 tab(s), Oral, Daily  furosemide 40 mg oral tablet: 40 mg = 1 tab(s), Oral, Daily  pravastatin 40 mg oral tablet: 40 mg = 1 tab(s), Oral, qAM, # 90 tab(s), 0 Refill(s)  prednisONE 10 mg oral tablet: 10 mg = 1 tab(s), Oral, Daily  sotalol 80 mg oral tablet: 1/2 tab, Oral, BID, 0 Refill(s)   Problem list:    All Problems  Cancer of prostate / 9903788845 / Confirmed  Degenerative arthritis of right wrist / 1290405070 / Confirmed  Ex-smoker / 21308386 / Confirmed  radiation therapy 1 yr ago / 545573175 / Confirmed  Hypercholesterolemia / 51000522 / Confirmed  Hypertension / 02134873 / Confirmed  Obesity / 8955315098 / Probable  Resolved: Alteration in patterns of urinary elimination / 14943795  Resolved: At risk for falls / 080981631  Resolved: At risk for impaired skin integrity / 129012643  Resolved: At risk for infection / 423182273  Resolved: At risk of pressure sore / 868689307  Resolved: At risk of UTI / 152189913  Resolved: A-Fib /  08456905  Resolved: Bowel dysfunction / 929523065  Resolved: Deficient knowledge / 3571745073  Resolved: Impaired mobility / 658307791  Resolved: Impaired skin integrity / 94855346  Resolved: Ineffective airway clearance / 918532614,    Active Problems (7)  Cancer of prostate   Degenerative arthritis of right wrist   Ex-smoker   Hypercholesterolemia   Hypertension   Obesity   radiation therapy 1 yr ago         Histories   Past Medical History:    Active  Hypertension (85747904)  Hypercholesterolemia (95144024)  Ex-smoker (58678800)  Cancer of prostate (5181042405)  radiation therapy 1 yr ago (473738885)  Resolved  A-Fib (63852193):  Resolved.  Comments:  10/23/2013 CDT 8:37 CDT - Mary Richard RN  newly diagnosed- appt scheduled to see cardiologist( per [facility])   Family History:    Myocardial infarct  Father  Hodgkins disease.....  Father     Procedure history:    Bronchoscopy w/ Needle Aspir Biopsy(s) on 2/14/2018 at 84 Years.  Comments:  2/14/2018 11:40 RADHA Zheng RRT, Madie L  auto-populated from documented surgical case  Bronchoscopy, [place], 2 or Less Samples on 2/14/2018 at 84 Years.  Comments:  2/14/2018 11:40 Madie Barlow RRT L  auto-populated from documented surgical case  Cardioversion (., None) on 12/18/2013 at 80 Years.  Comments:  12/18/2013 7:09 Anthony Eli RN  auto-populated from documented surgical case  Total Knee Arthroplasty (Left) on 11/4/2013 at 79 Years.  Comments:  11/4/2013 12:33 RADHA Walters RN, Sandy  auto-populated from documented surgical case  Prostatectomy (421612276).  Tonsillectomy with adenoidectomy (60699515).  Appendectomy (408308978).  pacemaker.  colonoscopy.   Social History        Social & Psychosocial Habits    Alcohol    Comment: occasional wine - 10/23/2013 08:35 - Mary Richard RN    Employment/School  06/06/2017  Status: Retired    Substance Use  10/23/2013 Risk Assessment: Denies Substance Abuse    12/29/2017  Use:  Never    Tobacco  01/19/2021  Use: Former smoker, quit more    Patient Wants Consult For Cessation Counseling N/A    Type: Cigars    Stopped at age: 70 Years    Abuse/Neglect  01/19/2021  [facility] Any signs of abuse or neglect No    Feels unsafe at home: No    Safe place to go: Yes      07/07/2020  [facility] [employer]  .        Physical Examination   Vital Signs   1/19/2021 9:21 CST       Temperature Oral          36.7 DegC                             Temperature Oral (calculated)             98.06 DegF                             Peripheral Pulse Rate     60 bpm                             SpO2                      97 %                             Oxygen Therapy            Room air                             Systolic Blood Pressure   147 mmHg  HI                             Diastolic Blood Pressure  91 mmHg  HI        Vital Signs (last 24 hrs)_____  Last Charted___________  Temp Oral     36.7 DegC  (JAN 19 09:21)  Heart Rate Peripheral   60 bpm  (JAN 19 09:21)  SBP      H 147mmHg  (JAN 19 09:21)  DBP      H 91mmHg  (JAN 19 09:21)  SpO2      97 %  (JAN 19 09:21)  Weight      93.6 kg  (JAN 19 09:21)  Height      184 cm  (JAN 19 09:21)  BMI      27.65  (JAN 19 09:21)     General:  Alert and oriented, No acute distress, Pleasant, elderly male.         Ambulation status: Assistive devices ( Cane ).    Eye:  Pupils are equal, round and reactive to light, Extraocular movements are intact, Normal conjunctiva, Vision unchanged.    HENT:  Normocephalic, Normal hearing, Oral mucosa is moist.    Neck:  Supple, Non-tender, No lymphadenopathy.    Respiratory:  Lungs are clear to auscultation, Respirations are non-labored, Breath sounds are equal.    Cardiovascular:  Normal rate, Regular rhythm, bilateral lower extremity edema- 2+ ; compression wrap stockings in place.    Gastrointestinal:  Soft, Non-tender, Non-distended, Normal bowel sounds.    Integumentary:  Warm, Dry, Pink, Intact, abrasion to right forearm from  fall.    Neurologic:  Alert, Oriented, Normal sensory, No focal deficits, Walks with a cane.    Psychiatric:  Cooperative, Appropriate mood & affect.    Genitourinary:  incontinent, wears depend undergarmet.    Lymphatics:  No lymphadenopathy neck, axilla, groin.    Musculoskeletal:  No deformity,      Mobility/ gait: Able to walk with minimal assistance, Able to walk with a cane, Geriatric stiffness, compression stockings. Using compression device.    Cognition and Speech:  Oriented, Speech clear and coherent.    ECOG Performance Scale: 2 - Capable of all self-care but unable to carry out any work activities. Up and about greater than 50 percent of waking hours.      Review / Management   Results review      Impression and Plan   Diagnoses:  1.  Metastatic prostate cancer with metastatic disease to the bones, liver, and lymph nodes.    Plan:  We had previously discussed at length the possibility of treating with chemotherapy, however due to the fact that the patient is 87 years old and does have some baseline weakness, do not feel that chemotherapy would be the best option for him.  He did undergo patient education, however while at that visit, I discussed starting testosterone injections, 400 mg IM given every 4 weeks.  The goal of this is to control his symptoms and decrease his PSA.    His PSA has been decreasing. Pending from today.  Will only repeat scans if PSA begins to rise.   - Continue testosterone q 4 weeks, due today. Ok to proceed.   Patient is aware that if/when the testosterone stopped working, I would not proceed with chemotherapy.    Continue scheduled follow-up appointments with physicians - Cardiology and primary care.     RTC in 4 weeks for OV and clinical examination.   - CBC ,CMP, PSA prior to appt  - Has bone density scheduled next week with Dr. Vides  All questions answered at this time.

## 2022-04-30 NOTE — PROGRESS NOTES
Patient:   Aldo Hernandez            MRN: [id number]            FIN: [id number]               Age:   86 years     Sex:  Male     :  11/15/1933   Associated Diagnoses:   None   Author:   Camryn CHACON, Dominique ALVES        Referring Physician:  Azael Rodgers MD         Visit Information     Problem List:   1.  Metastatic castrate resistant prostate cancer.    Treatment history:  1.  Previously taking Xtandi and prednisone    Current Treatment:   1.  Lupron every 6 months.    2.  Testosterone injections every 4 weeks starting 2020.      Diagnosis/Treatment/HPI:   1.  Patient was diagnosed with prostate cancer and underwent a prostatectomy in , pathology showed a Gwen score of 3+3 equal 6.  There was no extracapsular extension, no SVI, and surgical margins were negative.  9 pelvic lymph nodes were removed and all were negative.  His postoperative PSA was undetectable.  2.  In , the patient's PSA increased from 0.2-1.27.  He was started on Lupron therapy in 2009, Casodex added in 2010.  He continued to have a rise in his PSA and he underwent imaging, and a CT of the abdomen and pelvis on 2012 showed a 2 x 3 cm area of recurrent disease within the postoperative bed.  The patient underwent 39 fractions of radiation from 2012 to 2012 with Dr. Azael Rodgers.  3.  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  He was treated with EBRT to the mediastinum from 3/6/2018 to 3/19/2018.  4.  He was then started Xtandi and prednisone in 2018.  5.  He then underwent a CT scan of the chest, abdomen, and pelvis on 2019 that showed interval enlargement of multiple mediastinal nodes outside of his previous radiation fields, was treated again with a EBRT to the mediastinum  "on 6/12/2019 to 6/26/2019.  6.  The patient's PSA continued to go up and was 54 in December 2019, therefore underwent a CT of the chest, abdomen, and pelvis on 1/9/2020 that showed interval enlargement of mediastinal lymphadenopathy, however this was compared to a PET/CT from 1/16/2018 and not his most recent CT from May 2019.  He also had a bone scan on 1/9/2020 that showed increased area of uptake in the left acetabulum, left superior pubic ramus, and pubic symphysis.  7.  CT of the chest, abdomen, and pelvis on 5/21/2020 showed enlarging mediastinal adenopathy especially in the high paratracheal region of the right.  There was a new metastatic lesion within the liver straddling segment IV/VIII.  There was more conspicuous sclerosis of the left pubic ramus and acetabulum as well as new area of sclerosis involving the T6 vertebral body.  Findings are suggestive of metastatic disease.    8.  Seen by Dr. Cole on 5/22/2020, due to liver mets patient not a candidate for radium 223 or radiation.    9.  Bone scan on 5/26/2020 showed new metastatic lesions involving T6 vertebral body in the right acetabulum.  Also there was progressed metastatic involvement in the left acetabulum and the left pubic rami.  PSA on 6/4/2020 was 332.86.   10.  Testosterone injections, 400 mg IM given every 4 weeks with the first dose on 7/7/2020.           Chief Complaint    Follow-up visit      Interval History   Patient here for follow-up visit. Doing well overall aside from "bone spur" to left foot which he states is "very painful." He tentatively scheduled for surgical removal, however awaiting cardiac clearance prior to procedure. Aside from this no new complaints. Denies HA, SOB, CP, N/V/C/D. Denies new pain/discomfort. Denies new neurological symptoms. VSS. Recent labs show some signs of dehydration- when discussing with patient he reports that he was recently started on "fluid pill" per cardiology and that since that time he has been " "decreasing his PO intake of water d/t "urinating too much." Remainder of CBC/CMP reviewed as noted below. PSA remains stable at 395 (previously 385).   No other questions/concerns noted today.     PMHx:  1.  Hypertension  2.  Atrial fibrillation status post pacemaker     PSHx:   1.  Prostatectomy  2.  Left knee replacement    Social Hx:   The patient is , was a previous smoker of half a pack a day for 20 years but quit in 1980.  He denies alcohol or illicit drug use.    Family Hx:   He has a family history of hypertension and arthritis.         Review of Systems   14 point review of systems done in full with pertinent positives as described above  Remainder of review systems done in full and unremarkable.      Health Status   Allergies:    Allergic Reactions (All)  No Known Allergies,    Allergies (1) Active Reaction  No Known Allergies None Documented     Current medications:  (Selected)   Outpatient Medications  Ordered  Versed: 1 mg, form: Injection, IV Push, Once, first dose 02/14/18 11:00:00 CST, stop date 02/14/18 11:00:00 CST, 1 to 2 mg initial then 1 mg every 2 min until sedation level 2 achieved  Versed: 2 mg, form: Injection, IV Slow, Once, first dose 02/14/18 9:00:00 CST, stop date 02/14/18 9:00:00 CST, give 1mg IVP repeat in 5min if still anxious for a total of 2mg.  Future  CCA Normal Saline (0.9% NS) - 500 mL: 500, form: Infusion, IV Piggyback, Once-chemo, Infuse over: 1 hr, *Est. first dose 09/28/20 13:32:00 CDT, *Est. stop date 09/28/20 13:32:00 CDT, Future Order, Days 1  Testosterone Cypionate 200 mg/mL intramuscular solution: 400 mg, form: Soln, IM, Once-chemo, *Est. first dose 11/24/20 14:27:00 CST, *Est. stop date 11/24/20 14:27:00 CST, Routine, Weeks 17, Future Order  Testosterone Cypionate 200 mg/mL intramuscular solution: 400 mg, form: Soln, IM, Once-chemo, *Est. first dose 12/22/20 14:27:00 CST, *Est. stop date 12/22/20 14:27:00 CST, Routine, Weeks 21, Future Order  Testosterone " Cypionate 200 mg/mL intramuscular solution: 400 mg, form: Soln, IM, Once-chemo, first dose 09/28/20 13:20:00 CDT, stop date 09/28/20 13:20:00 CDT, Routine, Weeks 9, Future Order  Testosterone Cypionate 200 mg/mL intramuscular solution: 400 mg, form: Soln, IM, Once-chemo, first dose 10/26/20 13:30:00 CDT, stop date 10/26/20 13:30:00 CDT, Routine, Weeks 13, Future Order  Prescriptions  Prescribed  Percocet 5/325 oral tablet: 1 tab(s), Oral, q6hr, PRN PRN for pain, quantity medically necessary, # 120 tab(s), 0 Refill(s), Pharmacy: [id number], 184, cm, Height/Length Dosing, 06/19/20 9:50:00 CDT, 89.8, kg, Weight Dosing, 06/19/20 9:50:00 CDT  Documented Medications  Documented  Centrum Silver Ultra Men's: 1 tab(s), Oral, Daily, 0 Refill(s)  Citracal Maximum + D oral tablet: 1 tab(s), Oral, Daily, # 60 tab(s), 0 Refill(s)  Pradaxa 75 mg oral capsule: 75 mg = 1 cap(s), Oral, BID, 0 Refill(s)  Probiotic Formula (Bacillus Coagulans): tab 1, Oral, Daily, 0 Refill(s)  Vitamin D3 5000 intl units oral capsule: = 1 tab(s), Oral, Daily, 0 Refill(s)  Vitamin D3: 5,000 units = 5 tab(s), Oral, Daily, 0 Refill(s)  alendronate 70 mg oral tablet: 70 mg = 1 tab(s), Oral, qWeek  furosemide 20 mg oral tablet: 20 mg = 1 tab(s), Oral, Daily  pravastatin 40 mg oral tablet: 40 mg = 1 tab(s), Oral, qAM, # 90 tab(s), 0 Refill(s)  prednisONE 10 mg oral tablet: 10 mg = 1 tab(s), Oral, Daily  sotalol 80 mg oral tablet: 1/2 tab, Oral, BID, 0 Refill(s)   Problem list:    All Problems  Cancer of prostate / SNOMED CT 7909942820 / Confirmed  Degenerative arthritis of right wrist / SNOMED CT 7412477910 / Confirmed  Ex-smoker / SNOMED CT 50504304 / Confirmed  radiation therapy 1 yr ago / SNOMED CT 384496316 / Confirmed  Hypercholesterolemia / SNOMED CT 34670540 / Confirmed  Hypertension / SNOMED CT 88139163 / Confirmed  Obesity / SNOMED CT 5033494100 / Probable  Resolved: Alteration in patterns of urinary elimination / SNOMED CT 46215340  Problem  automatically updated based on documentation on Genitourinary Symptoms and/or Urinary Elimination.  Resolved: At risk for falls / SNOMED CT 956488033  Problem automatically updated based on documentation on History of Falls, History of Falls in last 3 months Stearns, Stearns Fall Risk Score and/or ADLs.  Resolved: At risk for impaired skin integrity / SNOMED CT 695996348  Problem automatically updated based on documentation on Skin Abnormality Type, Pressure Ulcer Present on Admission, Oxygen Therapy, Mask/Delivery Type, Pressure Point, and/or a Demian Score of less than 16.  Resolved: At risk for infection / SNOMED CT 161774304  Problem automatically updated based on documentation on Surgical Drain Tube Type and/or Skin Abnormality Type.  Resolved: At risk of pressure sore / SNOMED CT 311610963  Resolved: At risk of UTI / SNOMED CT 646518927  Problem automatically updated based on documentation on Urinary Elimination.  Resolved: A-Fib / SNOMED CT 82036217  newly diagnosed- appt scheduled to see cardiologist( per [facility])  Resolved: Bowel dysfunction / SNOMED CT 741530469  Problem added automatically by system based on documentation of Bowel Sounds as Hyperactive, Hyperactive, or Absent;  GI Symptoms as Hyperactive, Constipation, or Diarrhea and/or Passing Flatus as No.  Resolved: Deficient knowledge / SNOMED CT 6039418570  Problem automatically updated based on documentation on Barriers to Learning, Level of Consciousness, Orientation Assessment, or Sensory Deficits.  Resolved: Impaired mobility / SNOMED CT 724197645  Problem automatically updated based on documentation on Assistive Device, ADLs, Activity Status ADLs, Musculoskeletal Range of Motion, Musculoskeletal Activity, Special Orthopedic Devices, and/or Traction Type.  Resolved: Impaired skin integrity / SNOMED CT 85494276  Problem automatically updated based on documentation on Skin Abnormality Type and/or Pressure Ulcer Present on Admisison.  Resolved:  Ineffective airway clearance / SNOMED CT 174212529  Problem automatically updated based on documentation of Shortness of Breath on Respiratory Symptoms; SpO2 less than 92%; Unable to clear secretions or Weak on Cough.,    Active Problems (7)  Cancer of prostate   Degenerative arthritis of right wrist   Ex-smoker   Hypercholesterolemia   Hypertension   Obesity   radiation therapy 1 yr ago         Physical Examination   Vital Signs   9/28/2020 12:38 CDT      Temperature Oral          36.5 DegC                             Temperature Oral (calculated)             97.70 DegF                             Peripheral Pulse Rate     53 bpm  LOW                             Respiratory Rate          20 br/min                             SpO2                      99 %                             Oxygen Therapy            Room air                             Systolic Blood Pressure   127 mmHg                             Diastolic Blood Pressure  83 mmHg                             Blood Pressure Location   Right arm                             Manual Cuff BP            No                             O2 SAT at rest            99 %     Measurements from flowsheet : Measurements   9/28/2020 12:38 CDT      Weight Dosing             95.300 kg                             Weight Measured           95.3 kg                             Weight Measured and Calculated in Lbs     210.10 lb                             Weight Estimated          95.3 kg                             Height/Length Dosing      184.00 cm                             Height/Length Measured    184 cm                             Height/Length Estimated   184 cm                             BSA Measured              2.21 m2                             BSA Estimated             2.21 m2                             Body Mass Index Estimated 28.15 kg/m2                             Body Mass Index Measured  28.15 kg/m2               General:  Alert and oriented, No acute  distress.    Eye:  Extraocular movements are intact, Normal conjunctiva.    HENT:  Normocephalic, Oral mucosa is moist.    Neck:  Supple, Non-tender, No lymphadenopathy.    Respiratory:  Lungs are clear to auscultation, Respirations are non-labored, Breath sounds are equal.    Cardiovascular:  Normal rate, Regular rhythm, bilateral lower extremity edema- 2+ ; compression stockings in place.    Gastrointestinal:  Soft, Non-tender, Non-distended, Normal bowel sounds.    Integumentary:  Warm, Dry, Pink, Intact.    Neurologic:  Alert, Oriented, Normal sensory, No focal deficits, Walks with a cane.    Psychiatric:  Cooperative, Appropriate mood & affect.    Lymphatics:  No lymphadenopathy neck, axilla, groin.    Cognition and Speech:  Oriented, Speech clear and coherent.    ECOG Performance Scale: 2 - Capable of all self-care but unable to carry out any work activities. Up and about greater than 50 percent of waking hours.      Review / Management   Results review:  Last 10 Days Lab Results : Lab View   9/25/2020 9:08 CDT       Sodium Lvl                141 mmol/L                             Potassium Lvl             5.2 mmol/L  HI                             Chloride                  102 mmol/L                             CO2                       32 mmol/L  HI                             Calcium Lvl               8.7 mg/dL  LOW                             Glucose Lvl               105 mg/dL                             BUN                       31.3 mg/dL  HI                             Creatinine                1.40 mg/dL  HI                             eGFR-AA                   >60  NA                             eGFR-ORI                  51  NA                             Bili Total                1.2 mg/dL                             Bili Direct               0.4 mg/dL                             Bili Indirect             0.80 mg/dL                             AST                       22 unit/L                              ALT                       8 unit/L                             Alk Phos                  161 unit/L  HI                             Total Protein             6.4 gm/dL                             Albumin Lvl               3.7 gm/dL                             Globulin                  2.7 gm/dL                             A/G Ratio                 1.4 ratio                             PSA                       395.66 ng/mL  HI                             WBC                       9.7 x10(3)/mcL                             RBC                       4.92 x10(6)/mcL                             Hgb                       15.9 gm/dL                             Hct                       50.3 %                             Platelet                  198 x10(3)/mcL                             MCV                       102.2 fL  HI                             MCH                       32.3 pg  HI                             MCHC                      31.6 gm/dL  LOW                             RDW                       13.6 %                             MPV                       10.5 fL                             Abs Neut                  8.65 x10(3)/mcL                             NEUT%                     89.1 %  NA                             NEUT#                     8.6 x10(3)/mcL                             LYMPH%                    5.1 %  NA                             LYMPH#                    0.5 x10(3)/mcL  LOW                             MONO%                     4.4 %  NA                             MONO#                     0.4 x10(3)/mcL                             EOS%                      0.1 %  NA                             EOS#                      0.0 x10(3)/mcL                             BASO%                     0.2 %  NA                             BASO#                     0.0 x10(3)/mcL  .       Impression and Plan   Diagnoses:  1.  Metastatic prostate cancer with metastatic disease to the  bones, liver, and lymph nodes.    Plan:  We had previously discussed at length the possibility of treating with chemotherapy, however due to the fact that the patient is 86 years old and does have some baseline weakness, do not feel that chemotherapy would be the best option for him.  He did undergo patient education, however while at that visit, I discussed starting testosterone injections, 400 mg IM given every 4 weeks.  The goal of this is to control his symptoms and decrease his PSA.    His PSA is stable. Will only repeat scans if PSA begins to rise.     Patient is aware that if/when the testosterone stopped working, I would not proceed with chemotherapy.    Follow up with cardiology and podiatry as scheduled for management of bone spur     Will plan for IV hydration today given his elyte imbalances; instructed to increase PO hydration    RTC in 4 weeks for OV and clinical examination     CBC ,CMP, PSA prior to appt    Dominique FRAUSTO, FNP-C

## 2022-04-30 NOTE — PROGRESS NOTES
Oncology Office Visit      Patient:   Aldo Hernandez            MRN: [id number]            FIN: [id number]               Age:   87 years     Sex:  Male     :  11/15/1933   Associated Diagnoses:   None   Author:   Aniya Monte NP        Referring Physician:  Azael Rodgers MD      Visit Information     Problem List:   1.  Metastatic castrate resistant prostate cancer.    Treatment history:  1.  Previously taking Xtandi and prednisone    Current Treatment:   1.  Lupron every 6 months.    2.  Testosterone injections every 4 weeks starting 2020.      Diagnosis/Treatment/HPI:   1.  Patient was diagnosed with prostate cancer and underwent a prostatectomy in , pathology showed a Dolores score of 3+3 equal 6.  There was no extracapsular extension, no SVI, and surgical margins were negative.  9 pelvic lymph nodes were removed and all were negative.  His postoperative PSA was undetectable.  2.  In , the patient's PSA increased from 0.2-1.27.  He was started on Lupron therapy in 2009, Casodex added in 2010.  He continued to have a rise in his PSA and he underwent imaging, and a CT of the abdomen and pelvis on 2012 showed a 2 x 3 cm area of recurrent disease within the postoperative bed.  The patient underwent 39 fractions of radiation from 2012 to 2012 with Dr. Azael Rodgers.  3.  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  He was treated with EBRT to the mediastinum from 3/6/2018 to 3/19/2018.  4.  He was then started Xtandi and prednisone in 2018.  5.  He then underwent a CT scan of the chest, abdomen, and pelvis on 2019 that showed interval enlargement of multiple mediastinal nodes outside of his previous radiation fields, was treated again with a EBRT  to the mediastinum on 6/12/2019 to 6/26/2019.  6.  The patient's PSA continued to go up and was 54 in December 2019, therefore underwent a CT of the chest, abdomen, and pelvis on 1/9/2020 that showed interval enlargement of mediastinal lymphadenopathy, however this was compared to a PET/CT from 1/16/2018 and not his most recent CT from May 2019.  He also had a bone scan on 1/9/2020 that showed increased area of uptake in the left acetabulum, left superior pubic ramus, and pubic symphysis.  7.  CT of the chest, abdomen, and pelvis on 5/21/2020 showed enlarging mediastinal adenopathy especially in the high paratracheal region of the right.  There was a new metastatic lesion within the liver straddling segment IV/VIII.  There was more conspicuous sclerosis of the left pubic ramus and acetabulum as well as new area of sclerosis involving the T6 vertebral body.  Findings are suggestive of metastatic disease.    8.  Seen by Dr. Cole on 5/22/2020, due to liver mets patient not a candidate for radium 223 or radiation.    9.  Bone scan on 5/26/2020 showed new metastatic lesions involving T6 vertebral body in the right acetabulum.  Also there was progressed metastatic involvement in the left acetabulum and the left pubic rami.  PSA on 6/4/2020 was 332.86.   10.  Testosterone injections, 400 mg IM given every 4 weeks with the first dose on 7/7/2020.      Chief Complaint       11/24/2020 13:28 CST     Pt states his surgery for his Bunion on his Left Great toe was cancelled at [facility] [place] because he was a High  Risk.  (Modified)   11/24/2020 13:11 CST     States he was suppose to have surgery on his bunion on his Left Great toe at [facility], but recieved a call 1 day before for a Covid 19 test before 4:00pm and he recieved the call at 2:00pm.  States [facility] [place] told him he was a high Risk.        Interval History   Patient here for follow-up visit. He is on Lupron as well as monthly testosterone injections. His PSA is elevated,  however, it is stable, not increasing. He is scheduled for bunionectomy on 11/6/20, cancelled due to elective procedure and high risk. He is disappointed about this. He continues with chronic symptoms, not worsening. No new symptoms.   He complains of frequent urination. Takes lasix 20mg daily. He does not have to get up in the middle of the night to urinate. He has been having this for quite a long time.   Denies HA, SOB, CP, N/V/C/D. Denies new pain/discomfort. Denies new neurological symptoms.              Review of Systems   14 point review of systems done in full with pertinent positives as described above  Remainder of review systems done in full and unremarkable.      Health Status   Allergies:    Allergic Reactions (All)  No Known Allergies,    Allergies (1) Active Reaction  No Known Allergies None Documented     Current medications:  (Selected)   Outpatient Medications  Ordered  Versed: 1 mg, form: Injection, IV Push, Once, first dose 02/14/18 11:00:00 CST, stop date 02/14/18 11:00:00 CST, 1 to 2 mg initial then 1 mg every 2 min until sedation level 2 achieved  Versed: 2 mg, form: Injection, IV Slow, Once, first dose 02/14/18 9:00:00 CST, stop date 02/14/18 9:00:00 CST, give 1mg IVP repeat in 5min if still anxious for a total of 2mg.  Future  Testosterone Cypionate 200 mg/mL intramuscular solution: 400 mg, form: Soln, IM, Once-chemo, *Est. first dose 12/22/20 14:27:00 CST, *Est. stop date 12/22/20 14:27:00 CST, Routine, Weeks 21, Future Order  Testosterone Cypionate 200 mg/mL intramuscular solution: 400 mg, form: Soln, IM, Once-chemo, first dose 11/24/20 14:40:00 CST, stop date 11/24/20 14:40:00 CST, Routine, Weeks 17, Future Order  Prescriptions  Prescribed  Percocet 5/325 oral tablet: 1 tab(s), Oral, q6hr, PRN PRN for pain, quantity medically necessary, # 120 tab(s), 0 Refill(s), Pharmacy: [facility]/pharmacy [facility][id number], 184, cm, Height/Length Dosing, 06/19/20 9:50:00 CDT, 89.8, kg, Weight Dosing, 06/19/20 9:50:00  CDT  Documented Medications  Documented  Aleve 220 mg oral tablet: 440 mg = 2 tab(s), Oral, q8hr, PRN PRN for pain, # 60 tab(s), 0 Refill(s)  Centrum Silver Ultra Men's: 1 tab(s), Oral, Daily, 0 Refill(s)  Citracal Maximum + D oral tablet: 1 tab(s), Oral, Daily, # 60 tab(s), 0 Refill(s)  Pradaxa 75 mg oral capsule: 75 mg = 1 cap(s), Oral, BID, 0 Refill(s)  Probiotic Formula (Bacillus Coagulans): tab 1, Oral, Daily, 0 Refill(s)  Vitamin D3: 5,000 units = 5 tab(s), Oral, Daily, 0 Refill(s)  acetaminophen-hydrocodone 325 mg-5 mg oral tablet: tab(s), Oral, q4-6hr  alendronate 70 mg oral tablet: 70 mg = 1 tab(s), Oral, qWeek  cephalexin 500 mg oral capsule: 500 mg = 1 cap(s), Oral, BID  furosemide 40 mg oral tablet: 40 mg = 1 tab(s), Oral, Daily  pravastatin 40 mg oral tablet: 40 mg = 1 tab(s), Oral, qAM, # 90 tab(s), 0 Refill(s)  prednisONE 10 mg oral tablet: 10 mg = 1 tab(s), Oral, Daily  sotalol 80 mg oral tablet: 1/2 tab, Oral, BID, 0 Refill(s)   Problem list:    All Problems  Cancer of prostate / 8108438087 / Confirmed  Degenerative arthritis of right wrist / 9363705362 / Confirmed  Ex-smoker / 78707482 / Confirmed  radiation therapy 1 yr ago / 026842574 / Confirmed  Hypercholesterolemia / 47794952 / Confirmed  Hypertension / 74128665 / Confirmed  Obesity / 9567907202 / Probable  Resolved: Alteration in patterns of urinary elimination / 45713015  Resolved: At risk for falls / 012835506  Resolved: At risk for impaired skin integrity / 119466575  Resolved: At risk for infection / 837650795  Resolved: At risk of pressure sore / 462128407  Resolved: At risk of UTI / 100594539  Resolved: A-Fib / 80434728  Resolved: Bowel dysfunction / 913342943  Resolved: Deficient knowledge / 0850237604  Resolved: Impaired mobility / 856808778  Resolved: Impaired skin integrity / 53144465  Resolved: Ineffective airway clearance / 347241307,    Active Problems (7)  Cancer of prostate   Degenerative arthritis of right wrist   Ex-smoker    Hypercholesterolemia   Hypertension   Obesity   radiation therapy 1 yr ago         Histories   Past Medical History:    Active  Hypertension (22401560)  Hypercholesterolemia (51337477)  Ex-smoker (96658921)  Cancer of prostate (3796813635)  radiation therapy 1 yr ago (107915133)  Resolved  A-Fib (85283791):  Resolved.  Comments:  10/23/2013 CDT 8:37 CDT - Mary Richard RN  newly diagnosed- appt scheduled to see cardiologist( [facility])   Family History:    Myocardial infarct  Father  Hodgkins disease.....  Father     Procedure history:    Bronchoscopy w/ Needle Aspir Biopsy(s) on 2/14/2018 at 84 Years.  Comments:  2/14/2018 11:40 RADHA - Marce RRT, Madie L  auto-populated from documented surgical case  Bronchoscopy, Ebus, 2 or Less Samples on 2/14/2018 at 84 Years.  Comments:  2/14/2018 11:40 RADHA Zheng RRT, Madie L  auto-populated from documented surgical case  Cardioversion (., None) on 12/18/2013 at 80 Years.  Comments:  12/18/2013 7:09 RADHA - Anthony Saldana RN  auto-populated from documented surgical case  Total Knee Arthroplasty (Left) on 11/4/2013 at 79 Years.  Comments:  11/4/2013 12:33 RADHA - SHAHAB Walters Sandy  auto-populated from documented surgical case  Prostatectomy (871695547).  Tonsillectomy with adenoidectomy (37149180).  Appendectomy (476529579).  pacemaker.  colonoscopy.   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        Physical Examination   Vital Signs   11/24/2020 13:11 CST     Temperature Oral          36.2 DegC                              Temperature Oral (calculated)             97.16 DegF                             Peripheral Pulse Rate     56 bpm  LOW                             SpO2                      99 %                             Oxygen Therapy            Room air                             Systolic Blood Pressure   133 mmHg                             Diastolic Blood Pressure  85 mmHg                             Blood Pressure Location   Left arm                             Manual Cuff BP            No     Measurements from flowsheet : Measurements   11/24/2020 13:11 CST     Weight Dosing             95.500 kg                             Weight Measured           95.5 kg                             Weight Measured and Calculated in Lbs     210.54 lb                             Height/Length Dosing      184.00 cm                             Height/Length Measured    184 cm                             BSA Measured              2.21 m2                             Body Mass Index Measured  28.21 kg/m2        Vital Signs (last 24 hrs)_____  Last Charted___________  Temp Oral     36.2 DegC  (NOV 24 13:11)  Heart Rate Peripheral   L 56bpm  (NOV 24 13:11)  SBP      133 mmHg  (NOV 24 13:11)  DBP      85 mmHg  (NOV 24 13:11)  SpO2      99 %  (NOV 24 13:11)  Weight      95.5 kg  (NOV 24 13:11)  Height      184 cm  (NOV 24 13:11)  BMI      28.21  (NOV 24 13:11)     General:  Alert and oriented, No acute distress.         Ambulation status: Assistive devices ( Cane ).    Eye:  Extraocular movements are intact, Normal conjunctiva, Vision unchanged.    HENT:  Normocephalic, Oral mucosa is moist.    Neck:  Supple, Non-tender, No lymphadenopathy.    Respiratory:  Lungs are clear to auscultation, Respirations are non-labored, Breath sounds are equal.    Cardiovascular:  Normal rate, Regular rhythm, bilateral lower extremity edema- 2+ ; compression stockings in place.    Gastrointestinal:  Soft, Non-tender, Non-distended, Normal  bowel sounds.    Integumentary:  Warm, Dry, Pink, Intact.    Neurologic:  Alert, Oriented, Normal sensory, No focal deficits, Walks with a cane.    Psychiatric:  Cooperative, Appropriate mood & affect.    Genitourinary:  incontinent, wears depend undergarmet.    Lymphatics:  No lymphadenopathy neck, axilla, groin.    Musculoskeletal:  Geriatric stiffness.    Cognition and Speech:  Oriented, Speech clear and coherent.    ECOG Performance Scale: 2 - Capable of all self-care but unable to carry out any work activities. Up and about greater than 50 percent of waking hours.      Review / Management   Results review   Laboratory Results   Today's Lab Results : PowerNote Discrete Results   11/20/2020 9:54 CST      Sodium Lvl                143 mmol/L                             Potassium Lvl             5.4 mmol/L  HI                             Chloride                  104 mmol/L                             CO2                       29 mmol/L                             Calcium Lvl               8.4 mg/dL  LOW                             Glucose Lvl               87 mg/dL                             BUN                       28.2 mg/dL  HI                             Creatinine                1.52 mg/dL  HI                             eGFR-AA                   56  NA                             eGFR-ORI                  46 mL/min/1.73 m2  NA                             Bili Total                1.2 mg/dL                             Bili Direct               0.4 mg/dL                             Bili Indirect             0.80 mg/dL                             AST                       22 unit/L                             ALT                       7 unit/L                             Alk Phos                  153 unit/L  HI                             Total Protein             6.5 gm/dL                             Albumin Lvl               3.5 gm/dL                             Globulin                  3.0 gm/dL                              A/G Ratio                 1.2 ratio                             PSA                       306.51 ng/mL  HI    11/20/2020 9:49 CST      WBC                       9.2 x10(3)/mcL                             RBC                       4.86 x10(6)/mcL                             Hgb                       15.3 gm/dL                             Hct                       48.6 %                             Platelet                  176 x10(3)/mcL                             MCV                       100.0 fL  HI                             MCH                       31.5 pg  HI                             MCHC                      31.5 gm/dL  LOW                             RDW                       13.5 %                             MPV                       10.7 fL                             Abs Neut                  7.96 x10(3)/mcL                             NEUT%                     86.2 %  NA                             NEUT#                     8.0 x10(3)/mcL                             LYMPH%                    4.8 %  NA                             LYMPH#                    0.4 x10(3)/mcL  LOW                             MONO%                     5.6 %  NA                             MONO#                     0.5 x10(3)/mcL                             EOS%                      1.6 %  NA                             EOS#                      0.2 x10(3)/mcL                             BASO%                     0.3 %  NA                             BASO#                     0.0 x10(3)/mcL           Impression and Plan   Diagnoses:  1.  Metastatic prostate cancer with metastatic disease to the bones, liver, and lymph nodes.    Plan:  We had previously discussed at length the possibility of treating with chemotherapy, however due to the fact that the patient is 86 years old and does have some baseline weakness, do not feel that chemotherapy would be the best option for him.  He did undergo patient  education, however while at that visit, I discussed starting testosterone injections, 400 mg IM given every 4 weeks.  The goal of this is to control his symptoms and decrease his PSA.    His PSA has decreased to 306.  Will only repeat scans if PSA begins to rise.   - Continue testosterone q 4 weeks, due today  Patient is aware that if/when the testosterone stopped working, I would not proceed with chemotherapy.    Follow up with cardiology and podiatry as scheduled for management of bone spur     RTC in 4 weeks for OV and clinical examination     CBC ,CMP, PSA prior to appt

## 2022-04-30 NOTE — OP NOTE
DATE OF SURGERY:        SURGEON:  OMER Peguero MD    PROCEDURE:  EBUS    PREOPERATIVE DIAGNOSIS:  Hot station 7 and station L4 nodes on PET scan.    POSTOPERATIVE DIAGNOSIS:  Non-small cell carcinoma.  Favor lung.    PROCEDURE IN DETAIL:  After informed consent had been obtained, the patient was prepped in the usual fashion.  The Olympus bronchoscope was entered through the vocal cords, which were normal pre and post bronchoscopy.  A bilateral endobronchial examination was carried out beginning with the trachea, which had a lot of thick mucosal rings, which were prominent, but the mucosa was normal.  The right upper lobe, right middle, and lower lobes were completely patent without significant abnormalities.  The left upper lobe lingula and lower lobes also had similar findings with no endobronchial lesions.  Olympus bronchoscope was then removed and the EBUS scope was then inserted and it did a dasia survey.  There was an L4 node which is about 1 cm and then there was a large dasia complex in the R7 area.  Biopsies of the R7 station x four were obtained.  One was sent for a quick read, then we also took a biopsy of the L4 dasia station.  Dr. Ty Barriga called me with the quick read and said the biopsy results were positive for non-small cell carcinoma, favored lung, but he will have to do additional staining to determine the origin of the malignancy.  Patient had minimal bleeding and tolerated the procedure very well.  We will contact him with final results.        ______________________________  G. Donald Peguero MD    GGG/PL  DD:  02/14/2018  Time:  10:13AM  DT:  02/14/2018  Time:  10:52AM  Job #:  [id number]    cc: Azael Rodgers M.D.

## 2022-09-21 ENCOUNTER — HISTORICAL (OUTPATIENT)
Dept: ADMINISTRATIVE | Facility: HOSPITAL | Age: 87
End: 2022-09-21

## 2022-09-22 ENCOUNTER — HISTORICAL (OUTPATIENT)
Dept: ADMINISTRATIVE | Facility: HOSPITAL | Age: 87
End: 2022-09-22